# Patient Record
Sex: FEMALE | Race: WHITE | NOT HISPANIC OR LATINO | URBAN - METROPOLITAN AREA
[De-identification: names, ages, dates, MRNs, and addresses within clinical notes are randomized per-mention and may not be internally consistent; named-entity substitution may affect disease eponyms.]

---

## 2024-10-16 ENCOUNTER — NURSING HOME VISIT (OUTPATIENT)
Dept: POST ACUTE CARE | Facility: EXTERNAL LOCATION | Age: 84
End: 2024-10-16
Payer: MEDICARE

## 2024-10-16 DIAGNOSIS — E78.2 MIXED HYPERLIPIDEMIA: ICD-10-CM

## 2024-10-16 DIAGNOSIS — R29.6 FREQUENT FALLS: ICD-10-CM

## 2024-10-16 DIAGNOSIS — I10 ESSENTIAL HYPERTENSION, BENIGN: ICD-10-CM

## 2024-10-16 DIAGNOSIS — I25.83 CORONARY ARTERY DISEASE DUE TO LIPID RICH PLAQUE: ICD-10-CM

## 2024-10-16 DIAGNOSIS — I25.10 CORONARY ARTERY DISEASE DUE TO LIPID RICH PLAQUE: ICD-10-CM

## 2024-10-16 DIAGNOSIS — R53.1 WEAKNESS: ICD-10-CM

## 2024-10-16 DIAGNOSIS — I63.81 LEFT SIDED LACUNAR INFARCTION (MULTI): Primary | ICD-10-CM

## 2024-10-16 DIAGNOSIS — R41.0 DELIRIUM: ICD-10-CM

## 2024-10-16 DIAGNOSIS — E55.9 VITAMIN D DEFICIENCY: ICD-10-CM

## 2024-10-16 DIAGNOSIS — R53.81 PHYSICAL DECONDITIONING: ICD-10-CM

## 2024-10-16 DIAGNOSIS — R41.89 COGNITIVE IMPAIRMENT: ICD-10-CM

## 2024-10-16 DIAGNOSIS — E03.9 HYPOTHYROIDISM, ACQUIRED: ICD-10-CM

## 2024-10-16 PROCEDURE — 99309 SBSQ NF CARE MODERATE MDM 30: CPT | Performed by: NURSE PRACTITIONER

## 2024-10-16 NOTE — LETTER
Patient: Nasima Nails  : 1940    Encounter Date: 10/16/2024    Chief Complaint:   SNF F/U  SNF F/U  -Multiple falls  -Physical deconditioning  -L thalamus infarct  -UTI    HPI:   84 year-old female presenting to the ER on 10/8/24 s/p multiple falls. Pt. resides at AL and this was her 2nd fall over the past week. She was noted to have a bruise to her R cheek. MRI revealed a small acute lacunar infarct in the L thalamus. Neuro was consulted. TTE was unremarkable. ASA/Plavix resistance panel indicated adequate therapeutic response. Pt. to F/U with neurology as an OP. UA was positive for pyuria, but culture was negative. No recommendation for ATB from ID. Geriatrics was consulted for delirium/agitation, Recommended frequent reorientation, minimize nocturnal disturbances, encourage oral intake, and physical therapy to address deficits. Pt. to F/U with geriatrics 6-8 weeks after discharge. PT/OT recommended SNF and patient was discharged to [facility] on 10/14/24.    Today, patient denies dizziness, HA, SOB, cough, or chest pain. She reports appetite at baseline. She is confused, but redirectable. Staff report no clinical concerns at this time.     ROS:    As above in HPI. Otherwise, all other systems have been reviewed and are negative for complaint.    Medications reviewed and verified in NH chart.     Patient Active Problem List   Diagnosis   • AAA (abdominal aortic aneurysm) without rupture (CMS-HCC)   • CAD (coronary artery disease)   • Chronic idiopathic constipation   • Essential hypertension, benign   • Frequent falls   • Hypothyroidism, acquired   • Cognitive impairment   • Mixed hyperlipidemia   • S/P AAA repair   • Left sided lacunar infarction (Multi)   • Routine general medical examination at a health care facility        No past medical history on file.    Past Surgical History:   Procedure Laterality Date   • ABDOMINAL AORTIC ANEURYSM REPAIR     • BACK SURGERY      cervical disc surgery   •  CARDIAC CATHETERIZATION      with 5 total stents in  and    • EYE SURGERY      cataract surgery   • JOINT REPLACEMENT     • KNEE ARTHROPLASTY Left 2006       Family History   Problem Relation Name Age of Onset   • Heart attack Mother  72   • Diabetes type II Brother     • Heart disease Maternal Grandmother  85   • Colon cancer Maternal Grandfather         Social History     Tobacco Use   Smoking Status Former   • Current packs/day: 0.00   • Average packs/day: 1 pack/day for 35.0 years (35.0 ttl pk-yrs)   • Types: Cigarettes   • Start date:    • Quit date:    • Years since quittin.9   Smokeless Tobacco Never       Social History     Substance and Sexual Activity   Alcohol Use Never       Social History     Substance and Sexual Activity   Drug Use Never       Allergies   Allergen Reactions   • Cephalexin Hives and Itching   • Ciprofloxacin Unknown   • Codeine Hives and Itching   • Erythromycin Hives and Itching   • Oxycodone Hives and Itching   • Tramadol Hives and Itching   • Minocycline Rash   • Penicillins Hives and Rash        Vital Signs:   132/87-75-18-97.5-97% on RA    Physical Exam:  General: Sitting up in WC in NAD, alert   Head/Face: NCAT, symmetrical  Eyes: PERRLA, no injection, no discharge  ENT: Hearing not impaired, ears without scars or lesions, nasal mucosa and turbinates pink, septum midline, lips pink and moist  Neck: Supple, symmetrical  Respiratory: CTA but diminished without adventitious sounds, respirations even and nonlabored without use of accessory muscles, good air exchange  Cardio: RRR without murmur or gallops, normal S1S2, no edema, pedal pulses 3+/4 bilaterally  Chest/Breast: Symmetrical  GI: BS x 4, normoactive, non-distended, abd round and soft, no masses or tenderness  : No suprapubic tenderness or distention  MSK: Gait not assessed, joints with full ROM without pain or contractures  Skin: Skin warm and dry, no induration, bruising to R side of face and  RFA  Neurologic: Cranial nerves II through XII intact, superficial touch and pain sensation intact  Psychiatric: Alert, oriented x 1-2, calm and cooperative, redirectable    Results/Data:   10/13/24: Glu 111, BUN 35, Cr 0.98, Wood 10.4, GFR 57, WBC 13.01    Assessment/Plan:  Frequent falls/physical deconditioning/weakness-c/w PT/OT, safety and fall precautions  Acute lacunar infarct in the L thalamus-c/w atorvastatin, plavix, and ASA, PT/OT, F/U at [facility]  Delirium/agitation/cognitive impairment/insomnia/anxiety-c/w buspar, remeron, and melatonin, vistaril prn for anxiety, seroquel prn for agitation, consult Psych prn, F/U with geriatrics after discharge  CAD s/p stent placement/HTN/HLD-2 gm Na+ diet, c/w statin, plavix, ASA, losartan, and metoprolol, monitor BP and HR, F/U with cardiologist after discharge  Vit D deficiency-c/w supplement  Hypothyroidism-c/w levothyroxine    Orders:  NNO    Code Status:   Full Code          Electronically Signed By: NINI Das   12/2/24 10:19 AM

## 2024-10-17 ENCOUNTER — NURSING HOME VISIT (OUTPATIENT)
Dept: POST ACUTE CARE | Facility: EXTERNAL LOCATION | Age: 84
End: 2024-10-17
Payer: MEDICARE

## 2024-10-17 ENCOUNTER — LAB REQUISITION (OUTPATIENT)
Dept: LAB | Facility: HOSPITAL | Age: 84
End: 2024-10-17
Payer: MEDICARE

## 2024-10-17 DIAGNOSIS — I21.9 ACUTE MYOCARDIAL INFARCTION, UNSPECIFIED: ICD-10-CM

## 2024-10-17 DIAGNOSIS — E03.9 HYPOTHYROIDISM, ACQUIRED: ICD-10-CM

## 2024-10-17 DIAGNOSIS — I10 ESSENTIAL (PRIMARY) HYPERTENSION: ICD-10-CM

## 2024-10-17 DIAGNOSIS — Z78.9 NURSING HOME RESIDENT: ICD-10-CM

## 2024-10-17 DIAGNOSIS — E78.2 MIXED HYPERLIPIDEMIA: ICD-10-CM

## 2024-10-17 DIAGNOSIS — I25.83 CORONARY ARTERY DISEASE DUE TO LIPID RICH PLAQUE: ICD-10-CM

## 2024-10-17 DIAGNOSIS — R29.6 FREQUENT FALLS: ICD-10-CM

## 2024-10-17 DIAGNOSIS — I71.40 ABDOMINAL AORTIC ANEURYSM, WITHOUT RUPTURE, UNSPECIFIED (CMS-HCC): ICD-10-CM

## 2024-10-17 DIAGNOSIS — I63.81 LEFT SIDED LACUNAR INFARCTION (MULTI): ICD-10-CM

## 2024-10-17 DIAGNOSIS — I25.10 CORONARY ARTERY DISEASE DUE TO LIPID RICH PLAQUE: ICD-10-CM

## 2024-10-17 DIAGNOSIS — I10 ESSENTIAL HYPERTENSION, BENIGN: ICD-10-CM

## 2024-10-17 DIAGNOSIS — Z00.00 ROUTINE GENERAL MEDICAL EXAMINATION AT A HEALTH CARE FACILITY: ICD-10-CM

## 2024-10-17 DIAGNOSIS — R41.89 COGNITIVE IMPAIRMENT: ICD-10-CM

## 2024-10-17 LAB
ANION GAP SERPL CALC-SCNC: 13 MMOL/L (ref 10–20)
BUN SERPL-MCNC: 31 MG/DL (ref 6–23)
CALCIUM SERPL-MCNC: 10.4 MG/DL (ref 8.6–10.3)
CHLORIDE SERPL-SCNC: 105 MMOL/L (ref 98–107)
CO2 SERPL-SCNC: 27 MMOL/L (ref 21–32)
CREAT SERPL-MCNC: 1.1 MG/DL (ref 0.5–1.05)
EGFRCR SERPLBLD CKD-EPI 2021: 50 ML/MIN/1.73M*2
ERYTHROCYTE [DISTWIDTH] IN BLOOD BY AUTOMATED COUNT: 14.2 % (ref 11.5–14.5)
GLUCOSE SERPL-MCNC: 90 MG/DL (ref 74–99)
HCT VFR BLD AUTO: 40.9 % (ref 36–46)
HGB BLD-MCNC: 13.1 G/DL (ref 12–16)
MCH RBC QN AUTO: 30.3 PG (ref 26–34)
MCHC RBC AUTO-ENTMCNC: 32 G/DL (ref 32–36)
MCV RBC AUTO: 95 FL (ref 80–100)
NRBC BLD-RTO: 0 /100 WBCS (ref 0–0)
PLATELET # BLD AUTO: 285 X10*3/UL (ref 150–450)
POTASSIUM SERPL-SCNC: 5 MMOL/L (ref 3.5–5.3)
RBC # BLD AUTO: 4.32 X10*6/UL (ref 4–5.2)
SODIUM SERPL-SCNC: 140 MMOL/L (ref 136–145)
WBC # BLD AUTO: 10.5 X10*3/UL (ref 4.4–11.3)

## 2024-10-17 PROCEDURE — 36415 COLL VENOUS BLD VENIPUNCTURE: CPT | Mod: OUT | Performed by: INTERNAL MEDICINE

## 2024-10-17 PROCEDURE — 80048 BASIC METABOLIC PNL TOTAL CA: CPT | Mod: OUT | Performed by: INTERNAL MEDICINE

## 2024-10-17 PROCEDURE — 99306 1ST NF CARE HIGH MDM 50: CPT | Performed by: INTERNAL MEDICINE

## 2024-10-17 PROCEDURE — 85027 COMPLETE CBC AUTOMATED: CPT | Mod: OUT | Performed by: INTERNAL MEDICINE

## 2024-10-24 ENCOUNTER — LAB REQUISITION (OUTPATIENT)
Dept: LAB | Facility: HOSPITAL | Age: 84
End: 2024-10-24
Payer: MEDICARE

## 2024-10-24 DIAGNOSIS — I71.40 ABDOMINAL AORTIC ANEURYSM, WITHOUT RUPTURE, UNSPECIFIED (CMS-HCC): ICD-10-CM

## 2024-10-24 DIAGNOSIS — I10 ESSENTIAL (PRIMARY) HYPERTENSION: ICD-10-CM

## 2024-10-24 LAB
ANION GAP SERPL CALC-SCNC: 15 MMOL/L (ref 10–20)
BUN SERPL-MCNC: 19 MG/DL (ref 6–23)
CALCIUM SERPL-MCNC: 9.9 MG/DL (ref 8.6–10.3)
CHLORIDE SERPL-SCNC: 107 MMOL/L (ref 98–107)
CO2 SERPL-SCNC: 23 MMOL/L (ref 21–32)
CREAT SERPL-MCNC: 0.84 MG/DL (ref 0.5–1.05)
EGFRCR SERPLBLD CKD-EPI 2021: 69 ML/MIN/1.73M*2
ERYTHROCYTE [DISTWIDTH] IN BLOOD BY AUTOMATED COUNT: 14.3 % (ref 11.5–14.5)
GLUCOSE SERPL-MCNC: 76 MG/DL (ref 74–99)
HCT VFR BLD AUTO: 40.2 % (ref 36–46)
HGB BLD-MCNC: 12.8 G/DL (ref 12–16)
MCH RBC QN AUTO: 30.1 PG (ref 26–34)
MCHC RBC AUTO-ENTMCNC: 31.8 G/DL (ref 32–36)
MCV RBC AUTO: 95 FL (ref 80–100)
NRBC BLD-RTO: 0 /100 WBCS (ref 0–0)
PLATELET # BLD AUTO: 308 X10*3/UL (ref 150–450)
POTASSIUM SERPL-SCNC: 3.9 MMOL/L (ref 3.5–5.3)
RBC # BLD AUTO: 4.25 X10*6/UL (ref 4–5.2)
SODIUM SERPL-SCNC: 141 MMOL/L (ref 136–145)
WBC # BLD AUTO: 11.6 X10*3/UL (ref 4.4–11.3)

## 2024-10-24 PROCEDURE — 85027 COMPLETE CBC AUTOMATED: CPT | Mod: OUT | Performed by: INTERNAL MEDICINE

## 2024-10-24 PROCEDURE — 36415 COLL VENOUS BLD VENIPUNCTURE: CPT | Mod: OUT | Performed by: INTERNAL MEDICINE

## 2024-10-24 PROCEDURE — 82374 ASSAY BLOOD CARBON DIOXIDE: CPT | Mod: OUT | Performed by: INTERNAL MEDICINE

## 2024-10-26 ENCOUNTER — NURSING HOME VISIT (OUTPATIENT)
Dept: POST ACUTE CARE | Facility: EXTERNAL LOCATION | Age: 84
End: 2024-10-26
Payer: MEDICARE

## 2024-10-26 DIAGNOSIS — I10 ESSENTIAL HYPERTENSION, BENIGN: ICD-10-CM

## 2024-10-26 DIAGNOSIS — R29.6 FREQUENT FALLS: ICD-10-CM

## 2024-10-26 DIAGNOSIS — E78.2 MIXED HYPERLIPIDEMIA: ICD-10-CM

## 2024-10-26 DIAGNOSIS — I25.10 CORONARY ARTERY DISEASE DUE TO LIPID RICH PLAQUE: ICD-10-CM

## 2024-10-26 DIAGNOSIS — I63.81 LEFT SIDED LACUNAR INFARCTION (MULTI): ICD-10-CM

## 2024-10-26 DIAGNOSIS — Z00.00 ROUTINE GENERAL MEDICAL EXAMINATION AT A HEALTH CARE FACILITY: ICD-10-CM

## 2024-10-26 DIAGNOSIS — I25.83 CORONARY ARTERY DISEASE DUE TO LIPID RICH PLAQUE: ICD-10-CM

## 2024-10-26 DIAGNOSIS — E03.9 HYPOTHYROIDISM, ACQUIRED: ICD-10-CM

## 2024-10-26 DIAGNOSIS — R41.89 COGNITIVE IMPAIRMENT: ICD-10-CM

## 2024-10-26 PROCEDURE — 99308 SBSQ NF CARE LOW MDM 20: CPT | Performed by: INTERNAL MEDICINE

## 2024-10-26 NOTE — LETTER
Patient: Nasima Nails  : 1940    Encounter Date: 10/26/2024    Name: Nasima Nails  : 1940  MRN: [id number]  Visit Date: 10/26/2024  Chief Complaint: Weekly SNF Physician Visit    HPI: 85 y/o, Full Code, lives at an AL, presented to ER with multiple falls. There is a bruise over her right cheek. MRI revealed a small acute lacunar infarct in the L thalamus. Neurology was consulted. TTE was unremarkable. Aspirin/Plavix resistance panel indicated adequate therapeutic response. Recommended follow up with [facility] outpatient. UA was positive for pyuria but Urine culture was negative. No recommendation for antibiotics from infectious disease. Geriatrics consulted for delirium/agitation. Recommending frequent reorientation, minimize nocturnal disturbances, encourage oral intake and physical therapy to address deficits. Patient should follow up outpatient with Geriatrics in 6 to 8 weeks. Seroquel PRN initiated. Once stabilized, pt was brought to [facility] on 10/17/2024 for ongoing med mgt and therapy services.     Subjective: Seen and examined today. Denies N/V/D/C/CP. No fever or chills. Nursing team report no issues.     The patient was counseled regarding diagnostic results, instructions for management, risk factor reductions, prognosis, patient and family education, impressions, risks and benefits of treatment options and importance of compliance with treatment. I have reviewed nursing notes since my last visit and document any significant changes Reviewed orders, medications, Labs. Reviewed chart looking at current medications, treatments, labs, x-rays etc.     ROS:  As above in subjective. Otherwise, all other systems have been reviewed and are negative for complaint.    Current Outpatient Medications   Medication Instructions   • acetaminophen (TYLENOL) 650 mg, oral, Every 12 hours PRN   • aspirin 81 mg, oral, Daily   • atorvastatin (LIPITOR) 80 mg, oral, Daily   • busPIRone  (BUSPAR) 5 mg, oral, 3 times daily   • cholecalciferol (VITAMIN D-3) 2,000 Units, oral, Daily RT   • clopidogrel (Plavix) 75 mg tablet 1 tablet, Daily   • hydrOXYzine HCL (ATARAX) 25 mg, oral, Every 6 hours PRN   • levothyroxine (Synthroid, Levoxyl) 88 mcg tablet 1 tablet, Daily   • losartan (Cozaar) 50 mg tablet 1 tablet, Daily   • melatonin 10 mg tablet 1 tablet, oral, Nightly PRN   • metoprolol tartrate (Lopressor) 25 mg tablet 1 tablet, 2 times daily   • mirtazapine (Remeron) 15 mg tablet 1 tablet, Nightly   • multivitamin tablet 1 tablet, oral, Daily     Vital Signs:   Vital Signs were reviewed in nursing home documentation.    Physical Exam  Vitals reviewed. Exam conducted with a chaperone present.   Constitutional:       Appearance: Normal appearance. She is well-developed.   HENT:      Head: Normocephalic.      Right Ear: External ear normal.      Left Ear: External ear normal.      Nose: Nose normal.      Mouth/Throat:      Lips: Pink.      Mouth: Mucous membranes are moist.   Eyes:      General: Lids are normal.      Pupils: Pupils are equal, round, and reactive to light.   Neck:      Trachea: Trachea normal.   Cardiovascular:      Rate and Rhythm: Normal rate and regular rhythm.      Heart sounds: Normal heart sounds.   Pulmonary:      Effort: Pulmonary effort is normal.      Breath sounds: Normal breath sounds.   Abdominal:      General: Bowel sounds are normal.      Palpations: Abdomen is soft.   Musculoskeletal:      Cervical back: Full passive range of motion without pain.   Skin:     General: Skin is warm and moist.      Findings: Bruising present.   Neurological:      General: No focal deficit present.      Mental Status: She is alert. Mental status is at baseline.      Motor: Weakness present.   Psychiatric:         Attention and Perception: Attention normal.         Mood and Affect: Mood normal.         Speech: Speech normal.         Behavior: Behavior is cooperative.         Thought Content:  Thought content normal.         Cognition and Memory: Cognition is impaired. Memory is impaired.         Judgment: Judgment normal.         Results/Data:   Lab Results   Component Value Date    WBC 10.0 10/31/2024    HGB 12.8 10/31/2024    HCT 40.4 10/31/2024     10/31/2024     10/31/2024    K 4.1 10/31/2024     10/31/2024    CREATININE 0.84 10/31/2024    BUN 17 10/31/2024    CO2 25 10/31/2024    HGBA1C 5.7 (H) 10/08/2024     Results were reviewed and addressed accordingly. Lab Results were also reviewed in [facility].     Provider Impression:   Multiple Falls most likely 2/2 stroke  - MRI revealed a small acute lacunar infarct in the L thalamus.   - Neurology was consulted.   - TTE was unremarkable.   - Aspirin/Plavix resistance panel indicated adequate therapeutic response.  - Recommended follow up with [facility] outpatient.     Agitation with Delirium during hospitalization  - Geriatrics consulted for delirium/agitation.   - Recommending frequent reorientation, minimize nocturnal disturbances, encourage oral intake and physical therapy to address deficits.   - Patient should follow up outpatient with Geriatrics in 6 to 8 weeks.    CV- CAD s/p stent x5, HTN, HLD  - c/w losartan, metoprolol  - c/w statin    Hypothyroidism  - c/w synthroid    Insomnia  - c/w melatonin    Vitamin D Deficiency  - c/w supplementation    Depression, Anxiety  - c/w remeron, buspar, PRN hydroxyzine    Code Status  - Full Code    Disposition  - lives in an assisted living  ----------------  Written by Yasmeen Pearson RN, acting as a scribe for Dr. Edwards. This note accurately reflects the work and decisions made by Dr. Edwards.     I, Dr. Edwards, attest all medical record entries made by the scribe were under my direction and were personally dictated by me. I have reviewed the chart and agree that the record accurately reflects my performance of the history, physical exam, and assessment and plan.        Electronically Signed By: Yola Ruelas MD   11/5/24  8:52 AM

## 2024-10-29 PROBLEM — Z86.79 S/P AAA REPAIR: Status: ACTIVE | Noted: 2018-07-05

## 2024-10-29 PROBLEM — Z00.00 ROUTINE GENERAL MEDICAL EXAMINATION AT A HEALTH CARE FACILITY: Status: ACTIVE | Noted: 2024-10-29

## 2024-10-29 PROBLEM — I10 ESSENTIAL HYPERTENSION, BENIGN: Status: ACTIVE | Noted: 2024-10-29

## 2024-10-29 PROBLEM — K59.04 CHRONIC IDIOPATHIC CONSTIPATION: Status: ACTIVE | Noted: 2024-10-10

## 2024-10-29 PROBLEM — Z98.890 S/P AAA REPAIR: Status: ACTIVE | Noted: 2018-07-05

## 2024-10-29 PROBLEM — E78.2 MIXED HYPERLIPIDEMIA: Status: ACTIVE | Noted: 2024-10-29

## 2024-10-29 PROBLEM — I71.40 AAA (ABDOMINAL AORTIC ANEURYSM) WITHOUT RUPTURE (CMS-HCC): Status: ACTIVE | Noted: 2018-07-03

## 2024-10-29 PROBLEM — E03.9 HYPOTHYROIDISM, ACQUIRED: Status: ACTIVE | Noted: 2020-10-01

## 2024-10-29 PROBLEM — R41.89 COGNITIVE IMPAIRMENT: Status: ACTIVE | Noted: 2024-10-08

## 2024-10-29 PROBLEM — I63.81 LEFT SIDED LACUNAR INFARCTION (MULTI): Status: ACTIVE | Noted: 2024-10-29

## 2024-10-29 PROBLEM — R29.6 FREQUENT FALLS: Status: ACTIVE | Noted: 2024-10-08

## 2024-10-29 RX ORDER — HYDROXYZINE HYDROCHLORIDE 25 MG/1
25 TABLET, FILM COATED ORAL EVERY 6 HOURS PRN
COMMUNITY

## 2024-10-29 RX ORDER — METOPROLOL TARTRATE 25 MG/1
1 TABLET, FILM COATED ORAL 2 TIMES DAILY
COMMUNITY
Start: 2023-01-13

## 2024-10-29 RX ORDER — ATORVASTATIN CALCIUM 80 MG/1
80 TABLET, FILM COATED ORAL DAILY
COMMUNITY

## 2024-10-29 RX ORDER — ASPIRIN 81 MG/1
81 TABLET ORAL DAILY
COMMUNITY

## 2024-10-29 RX ORDER — CLOPIDOGREL BISULFATE 75 MG/1
1 TABLET ORAL DAILY
COMMUNITY
Start: 2023-04-09

## 2024-10-29 RX ORDER — BISMUTH SUBSALICYLATE 262 MG
1 TABLET,CHEWABLE ORAL DAILY
COMMUNITY

## 2024-10-29 RX ORDER — ACETAMINOPHEN, DIPHENHYDRAMINE HCL, PHENYLEPHRINE HCL 325; 25; 5 MG/1; MG/1; MG/1
1 TABLET ORAL NIGHTLY PRN
COMMUNITY

## 2024-10-29 RX ORDER — LEVOTHYROXINE SODIUM 88 UG/1
1 TABLET ORAL DAILY
COMMUNITY
Start: 2023-02-09

## 2024-10-29 RX ORDER — ACETAMINOPHEN 325 MG/1
650 TABLET ORAL EVERY 12 HOURS PRN
COMMUNITY

## 2024-10-29 RX ORDER — LOSARTAN POTASSIUM 50 MG/1
1 TABLET ORAL DAILY
COMMUNITY
Start: 2023-04-09

## 2024-10-29 RX ORDER — BUSPIRONE HYDROCHLORIDE 5 MG/1
5 TABLET ORAL 3 TIMES DAILY
COMMUNITY

## 2024-10-29 RX ORDER — CHOLECALCIFEROL (VITAMIN D3) 25 MCG
2000 TABLET ORAL
COMMUNITY

## 2024-10-29 RX ORDER — MIRTAZAPINE 15 MG/1
1 TABLET, FILM COATED ORAL NIGHTLY
COMMUNITY
Start: 2023-02-09

## 2024-10-31 ENCOUNTER — LAB REQUISITION (OUTPATIENT)
Dept: LAB | Facility: HOSPITAL | Age: 84
End: 2024-10-31
Payer: MEDICARE

## 2024-10-31 ENCOUNTER — NURSING HOME VISIT (OUTPATIENT)
Dept: POST ACUTE CARE | Facility: EXTERNAL LOCATION | Age: 84
End: 2024-10-31
Payer: MEDICARE

## 2024-10-31 DIAGNOSIS — E78.2 MIXED HYPERLIPIDEMIA: ICD-10-CM

## 2024-10-31 DIAGNOSIS — Z00.00 ROUTINE GENERAL MEDICAL EXAMINATION AT A HEALTH CARE FACILITY: ICD-10-CM

## 2024-10-31 DIAGNOSIS — Z78.9 NURSING HOME RESIDENT: ICD-10-CM

## 2024-10-31 DIAGNOSIS — I25.83 CORONARY ARTERY DISEASE DUE TO LIPID RICH PLAQUE: ICD-10-CM

## 2024-10-31 DIAGNOSIS — E03.9 HYPOTHYROIDISM, ACQUIRED: ICD-10-CM

## 2024-10-31 DIAGNOSIS — F03.90 UNSPECIFIED DEMENTIA, UNSPECIFIED SEVERITY, WITHOUT BEHAVIORAL DISTURBANCE, PSYCHOTIC DISTURBANCE, MOOD DISTURBANCE, AND ANXIETY: ICD-10-CM

## 2024-10-31 DIAGNOSIS — I10 ESSENTIAL HYPERTENSION, BENIGN: ICD-10-CM

## 2024-10-31 DIAGNOSIS — R41.89 COGNITIVE IMPAIRMENT: ICD-10-CM

## 2024-10-31 DIAGNOSIS — I63.81 LEFT SIDED LACUNAR INFARCTION (MULTI): ICD-10-CM

## 2024-10-31 DIAGNOSIS — I25.10 CORONARY ARTERY DISEASE DUE TO LIPID RICH PLAQUE: ICD-10-CM

## 2024-10-31 DIAGNOSIS — R29.6 FREQUENT FALLS: ICD-10-CM

## 2024-10-31 LAB
ANION GAP SERPL CALC-SCNC: 13 MMOL/L (ref 10–20)
BUN SERPL-MCNC: 17 MG/DL (ref 6–23)
CALCIUM SERPL-MCNC: 10 MG/DL (ref 8.6–10.3)
CHLORIDE SERPL-SCNC: 107 MMOL/L (ref 98–107)
CO2 SERPL-SCNC: 25 MMOL/L (ref 21–32)
CREAT SERPL-MCNC: 0.84 MG/DL (ref 0.5–1.05)
EGFRCR SERPLBLD CKD-EPI 2021: 69 ML/MIN/1.73M*2
ERYTHROCYTE [DISTWIDTH] IN BLOOD BY AUTOMATED COUNT: 14.4 % (ref 11.5–14.5)
GLUCOSE SERPL-MCNC: 85 MG/DL (ref 74–99)
HCT VFR BLD AUTO: 40.4 % (ref 36–46)
HGB BLD-MCNC: 12.8 G/DL (ref 12–16)
MCH RBC QN AUTO: 30.3 PG (ref 26–34)
MCHC RBC AUTO-ENTMCNC: 31.7 G/DL (ref 32–36)
MCV RBC AUTO: 96 FL (ref 80–100)
NRBC BLD-RTO: 0 /100 WBCS (ref 0–0)
PLATELET # BLD AUTO: 312 X10*3/UL (ref 150–450)
POTASSIUM SERPL-SCNC: 4.1 MMOL/L (ref 3.5–5.3)
RBC # BLD AUTO: 4.23 X10*6/UL (ref 4–5.2)
SODIUM SERPL-SCNC: 141 MMOL/L (ref 136–145)
WBC # BLD AUTO: 10 X10*3/UL (ref 4.4–11.3)

## 2024-10-31 PROCEDURE — 85027 COMPLETE CBC AUTOMATED: CPT | Mod: OUT | Performed by: INTERNAL MEDICINE

## 2024-10-31 PROCEDURE — 36415 COLL VENOUS BLD VENIPUNCTURE: CPT | Mod: OUT | Performed by: INTERNAL MEDICINE

## 2024-10-31 PROCEDURE — 99308 SBSQ NF CARE LOW MDM 20: CPT | Performed by: INTERNAL MEDICINE

## 2024-10-31 PROCEDURE — 80048 BASIC METABOLIC PNL TOTAL CA: CPT | Mod: OUT | Performed by: INTERNAL MEDICINE

## 2024-10-31 NOTE — LETTER
Patient: Nasima Nails  : 1940    Encounter Date: 10/31/2024    Name: Nasima Nails  : 1940  MRN: [id number]  Visit Date: 10/31/2024  Chief Complaint: Weekly SNF Physician Visit    HPI: 85 y/o, Full Code, lives at an AL, presented to ER with multiple falls. There is a bruise over her right cheek. MRI revealed a small acute lacunar infarct in the L thalamus. Neurology was consulted. TTE was unremarkable. Aspirin/Plavix resistance panel indicated adequate therapeutic response. Recommended follow up with [facility] outpatient. UA was positive for pyuria but Urine culture was negative. No recommendation for antibiotics from infectious disease. Geriatrics consulted for delirium/agitation. Recommending frequent reorientation, minimize nocturnal disturbances, encourage oral intake and physical therapy to address deficits. Patient should follow up outpatient with Geriatrics in 6 to 8 weeks. Seroquel PRN initiated. Once stabilized, pt was brought to [facility] on 10/17/2024 for ongoing med mgt and therapy services.     Subjective: Seen and examined today. Denies N/V/D/C/CP. No fever or chills. Nursing team report no issues.     The patient was counseled regarding diagnostic results, instructions for management, risk factor reductions, prognosis, patient and family education, impressions, risks and benefits of treatment options and importance of compliance with treatment. I have reviewed nursing notes since my last visit and document any significant changes Reviewed orders, medications, Labs. Reviewed chart looking at current medications, treatments, labs, x-rays etc.     ROS:  As above in subjective. Otherwise, all other systems have been reviewed and are negative for complaint.    Current Outpatient Medications   Medication Instructions   • acetaminophen (TYLENOL) 650 mg, oral, Every 12 hours PRN   • aspirin 81 mg, oral, Daily   • atorvastatin (LIPITOR) 80 mg, oral, Daily   • busPIRone  (BUSPAR) 5 mg, oral, 3 times daily   • cholecalciferol (VITAMIN D-3) 2,000 Units, oral, Daily RT   • clopidogrel (Plavix) 75 mg tablet 1 tablet, Daily   • hydrOXYzine HCL (ATARAX) 25 mg, oral, Every 6 hours PRN   • levothyroxine (Synthroid, Levoxyl) 88 mcg tablet 1 tablet, Daily   • losartan (Cozaar) 50 mg tablet 1 tablet, Daily   • melatonin 10 mg tablet 1 tablet, oral, Nightly PRN   • metoprolol tartrate (Lopressor) 25 mg tablet 1 tablet, 2 times daily   • mirtazapine (Remeron) 15 mg tablet 1 tablet, Nightly   • multivitamin tablet 1 tablet, oral, Daily     Vital Signs:   Vital Signs were reviewed in nursing home documentation.    Physical Exam  Vitals reviewed. Exam conducted with a chaperone present.   Constitutional:       Appearance: Normal appearance. She is well-developed.   HENT:      Head: Normocephalic.      Right Ear: External ear normal.      Left Ear: External ear normal.      Nose: Nose normal.      Mouth/Throat:      Lips: Pink.      Mouth: Mucous membranes are moist.   Eyes:      General: Lids are normal.      Pupils: Pupils are equal, round, and reactive to light.   Neck:      Trachea: Trachea normal.   Cardiovascular:      Rate and Rhythm: Normal rate and regular rhythm.      Heart sounds: Normal heart sounds.   Pulmonary:      Effort: Pulmonary effort is normal.      Breath sounds: Normal breath sounds.   Abdominal:      General: Bowel sounds are normal.      Palpations: Abdomen is soft.   Musculoskeletal:      Cervical back: Full passive range of motion without pain.   Skin:     General: Skin is warm and moist.      Findings: Bruising present.   Neurological:      General: No focal deficit present.      Mental Status: She is alert. Mental status is at baseline.      Motor: Weakness present.   Psychiatric:         Attention and Perception: Attention normal.         Mood and Affect: Mood normal.         Speech: Speech normal.         Behavior: Behavior is cooperative.         Thought Content:  Thought content normal.         Cognition and Memory: Cognition is impaired. Memory is impaired.         Judgment: Judgment normal.         Results/Data:   Lab Results   Component Value Date    WBC 10.0 10/31/2024    HGB 12.8 10/31/2024    HCT 40.4 10/31/2024     10/31/2024     10/31/2024    K 4.1 10/31/2024     10/31/2024    CREATININE 0.84 10/31/2024    BUN 17 10/31/2024    CO2 25 10/31/2024    HGBA1C 5.7 (H) 10/08/2024     Results were reviewed and addressed accordingly. Lab Results were also reviewed in [facility].     Provider Impression:   Multiple Falls most likely 2/2 stroke  - MRI revealed a small acute lacunar infarct in the L thalamus.   - Neurology was consulted.   - TTE was unremarkable.   - Aspirin/Plavix resistance panel indicated adequate therapeutic response.  - Recommended follow up with [facility] outpatient.     Agitation with Delirium during hospitalization  - Geriatrics consulted for delirium/agitation.   - Recommending frequent reorientation, minimize nocturnal disturbances, encourage oral intake and physical therapy to address deficits.   - Patient should follow up outpatient with Geriatrics in 6 to 8 weeks.    CV- CAD s/p stent x5, HTN, HLD  - c/w losartan, metoprolol  - c/w statin    Hypothyroidism  - c/w synthroid    Insomnia  - c/w melatonin    Vitamin D Deficiency  - c/w supplementation    Depression, Anxiety  - c/w remeron, buspar, PRN hydroxyzine    Code Status  - Full Code    Disposition  - lives in an assisted living  ----------------  Written by Yasmeen Pearson RN, acting as a scribe for Dr. Edwards. This note accurately reflects the work and decisions made by Dr. Edwards.     I, Dr. Edwards, attest all medical record entries made by the scribe were under my direction and were personally dictated by me. I have reviewed the chart and agree that the record accurately reflects my performance of the history, physical exam, and assessment and plan.        Electronically Signed By: Yola Ruelas MD   11/5/24  8:53 AM

## 2024-11-04 NOTE — PROGRESS NOTES
Name: Nasima Nails  : 1940  MRN: [id number]  Visit Date: 10/26/2024  Chief Complaint: Weekly SNF Physician Visit    HPI: 85 y/o, Full Code, lives at an AL, presented to ER with multiple falls. There is a bruise over her right cheek. MRI revealed a small acute lacunar infarct in the L thalamus. Neurology was consulted. TTE was unremarkable. Aspirin/Plavix resistance panel indicated adequate therapeutic response. Recommended follow up with [facility] outpatient. UA was positive for pyuria but Urine culture was negative. No recommendation for antibiotics from infectious disease. Geriatrics consulted for delirium/agitation. Recommending frequent reorientation, minimize nocturnal disturbances, encourage oral intake and physical therapy to address deficits. Patient should follow up outpatient with Geriatrics in 6 to 8 weeks. Seroquel PRN initiated. Once stabilized, pt was brought to [facility] on 10/17/2024 for ongoing med mgt and therapy services.     Subjective: Seen and examined today. Denies N/V/D/C/CP. No fever or chills. Nursing team report no issues.     The patient was counseled regarding diagnostic results, instructions for management, risk factor reductions, prognosis, patient and family education, impressions, risks and benefits of treatment options and importance of compliance with treatment. I have reviewed nursing notes since my last visit and document any significant changes Reviewed orders, medications, Labs. Reviewed chart looking at current medications, treatments, labs, x-rays etc.     ROS:  As above in subjective. Otherwise, all other systems have been reviewed and are negative for complaint.    Current Outpatient Medications   Medication Instructions    acetaminophen (TYLENOL) 650 mg, oral, Every 12 hours PRN    aspirin 81 mg, oral, Daily    atorvastatin (LIPITOR) 80 mg, oral, Daily    busPIRone (BUSPAR) 5 mg, oral, 3 times daily    cholecalciferol (VITAMIN D-3) 2,000 Units,  oral, Daily RT    clopidogrel (Plavix) 75 mg tablet 1 tablet, Daily    hydrOXYzine HCL (ATARAX) 25 mg, oral, Every 6 hours PRN    levothyroxine (Synthroid, Levoxyl) 88 mcg tablet 1 tablet, Daily    losartan (Cozaar) 50 mg tablet 1 tablet, Daily    melatonin 10 mg tablet 1 tablet, oral, Nightly PRN    metoprolol tartrate (Lopressor) 25 mg tablet 1 tablet, 2 times daily    mirtazapine (Remeron) 15 mg tablet 1 tablet, Nightly    multivitamin tablet 1 tablet, oral, Daily     Vital Signs:   Vital Signs were reviewed in nursing home documentation.    Physical Exam  Vitals reviewed. Exam conducted with a chaperone present.   Constitutional:       Appearance: Normal appearance. She is well-developed.   HENT:      Head: Normocephalic.      Right Ear: External ear normal.      Left Ear: External ear normal.      Nose: Nose normal.      Mouth/Throat:      Lips: Pink.      Mouth: Mucous membranes are moist.   Eyes:      General: Lids are normal.      Pupils: Pupils are equal, round, and reactive to light.   Neck:      Trachea: Trachea normal.   Cardiovascular:      Rate and Rhythm: Normal rate and regular rhythm.      Heart sounds: Normal heart sounds.   Pulmonary:      Effort: Pulmonary effort is normal.      Breath sounds: Normal breath sounds.   Abdominal:      General: Bowel sounds are normal.      Palpations: Abdomen is soft.   Musculoskeletal:      Cervical back: Full passive range of motion without pain.   Skin:     General: Skin is warm and moist.      Findings: Bruising present.   Neurological:      General: No focal deficit present.      Mental Status: She is alert. Mental status is at baseline.      Motor: Weakness present.   Psychiatric:         Attention and Perception: Attention normal.         Mood and Affect: Mood normal.         Speech: Speech normal.         Behavior: Behavior is cooperative.         Thought Content: Thought content normal.         Cognition and Memory: Cognition is impaired. Memory is  impaired.         Judgment: Judgment normal.         Results/Data:   Lab Results   Component Value Date    WBC 10.0 10/31/2024    HGB 12.8 10/31/2024    HCT 40.4 10/31/2024     10/31/2024     10/31/2024    K 4.1 10/31/2024     10/31/2024    CREATININE 0.84 10/31/2024    BUN 17 10/31/2024    CO2 25 10/31/2024    HGBA1C 5.7 (H) 10/08/2024     Results were reviewed and addressed accordingly. Lab Results were also reviewed in [facility].     Provider Impression:   Multiple Falls most likely 2/2 stroke  - MRI revealed a small acute lacunar infarct in the L thalamus.   - Neurology was consulted.   - TTE was unremarkable.   - Aspirin/Plavix resistance panel indicated adequate therapeutic response.  - Recommended follow up with [facility] outpatient.     Agitation with Delirium during hospitalization  - Geriatrics consulted for delirium/agitation.   - Recommending frequent reorientation, minimize nocturnal disturbances, encourage oral intake and physical therapy to address deficits.   - Patient should follow up outpatient with Geriatrics in 6 to 8 weeks.    CV- CAD s/p stent x5, HTN, HLD  - c/w losartan, metoprolol  - c/w statin    Hypothyroidism  - c/w synthroid    Insomnia  - c/w melatonin    Vitamin D Deficiency  - c/w supplementation    Depression, Anxiety  - c/w remeron, buspar, PRN hydroxyzine    Code Status  - Full Code    Disposition  - lives in an assisted living  ----------------  Written by Yasmeen Pearson RN, acting as a scribe for Dr. Edwards. This note accurately reflects the work and decisions made by Dr. Edwards.     I, Dr. Edwards, attest all medical record entries made by the scribe were under my direction and were personally dictated by me. I have reviewed the chart and agree that the record accurately reflects my performance of the history, physical exam, and assessment and plan.

## 2024-11-04 NOTE — PROGRESS NOTES
Name: Nasima Nails  : 1940  MRN: [id number]  Visit Date: 10/31/2024  Chief Complaint: Weekly SNF Physician Visit    HPI: 83 y/o, Full Code, lives at an AL, presented to ER with multiple falls. There is a bruise over her right cheek. MRI revealed a small acute lacunar infarct in the L thalamus. Neurology was consulted. TTE was unremarkable. Aspirin/Plavix resistance panel indicated adequate therapeutic response. Recommended follow up with [facility] outpatient. UA was positive for pyuria but Urine culture was negative. No recommendation for antibiotics from infectious disease. Geriatrics consulted for delirium/agitation. Recommending frequent reorientation, minimize nocturnal disturbances, encourage oral intake and physical therapy to address deficits. Patient should follow up outpatient with Geriatrics in 6 to 8 weeks. Seroquel PRN initiated. Once stabilized, pt was brought to [facility] on 10/17/2024 for ongoing med mgt and therapy services.     Subjective: Seen and examined today. Denies N/V/D/C/CP. No fever or chills. Nursing team report no issues.     The patient was counseled regarding diagnostic results, instructions for management, risk factor reductions, prognosis, patient and family education, impressions, risks and benefits of treatment options and importance of compliance with treatment. I have reviewed nursing notes since my last visit and document any significant changes Reviewed orders, medications, Labs. Reviewed chart looking at current medications, treatments, labs, x-rays etc.     ROS:  As above in subjective. Otherwise, all other systems have been reviewed and are negative for complaint.    Current Outpatient Medications   Medication Instructions    acetaminophen (TYLENOL) 650 mg, oral, Every 12 hours PRN    aspirin 81 mg, oral, Daily    atorvastatin (LIPITOR) 80 mg, oral, Daily    busPIRone (BUSPAR) 5 mg, oral, 3 times daily    cholecalciferol (VITAMIN D-3) 2,000 Units,  oral, Daily RT    clopidogrel (Plavix) 75 mg tablet 1 tablet, Daily    hydrOXYzine HCL (ATARAX) 25 mg, oral, Every 6 hours PRN    levothyroxine (Synthroid, Levoxyl) 88 mcg tablet 1 tablet, Daily    losartan (Cozaar) 50 mg tablet 1 tablet, Daily    melatonin 10 mg tablet 1 tablet, oral, Nightly PRN    metoprolol tartrate (Lopressor) 25 mg tablet 1 tablet, 2 times daily    mirtazapine (Remeron) 15 mg tablet 1 tablet, Nightly    multivitamin tablet 1 tablet, oral, Daily     Vital Signs:   Vital Signs were reviewed in nursing home documentation.    Physical Exam  Vitals reviewed. Exam conducted with a chaperone present.   Constitutional:       Appearance: Normal appearance. She is well-developed.   HENT:      Head: Normocephalic.      Right Ear: External ear normal.      Left Ear: External ear normal.      Nose: Nose normal.      Mouth/Throat:      Lips: Pink.      Mouth: Mucous membranes are moist.   Eyes:      General: Lids are normal.      Pupils: Pupils are equal, round, and reactive to light.   Neck:      Trachea: Trachea normal.   Cardiovascular:      Rate and Rhythm: Normal rate and regular rhythm.      Heart sounds: Normal heart sounds.   Pulmonary:      Effort: Pulmonary effort is normal.      Breath sounds: Normal breath sounds.   Abdominal:      General: Bowel sounds are normal.      Palpations: Abdomen is soft.   Musculoskeletal:      Cervical back: Full passive range of motion without pain.   Skin:     General: Skin is warm and moist.      Findings: Bruising present.   Neurological:      General: No focal deficit present.      Mental Status: She is alert. Mental status is at baseline.      Motor: Weakness present.   Psychiatric:         Attention and Perception: Attention normal.         Mood and Affect: Mood normal.         Speech: Speech normal.         Behavior: Behavior is cooperative.         Thought Content: Thought content normal.         Cognition and Memory: Cognition is impaired. Memory is  impaired.         Judgment: Judgment normal.         Results/Data:   Lab Results   Component Value Date    WBC 10.0 10/31/2024    HGB 12.8 10/31/2024    HCT 40.4 10/31/2024     10/31/2024     10/31/2024    K 4.1 10/31/2024     10/31/2024    CREATININE 0.84 10/31/2024    BUN 17 10/31/2024    CO2 25 10/31/2024    HGBA1C 5.7 (H) 10/08/2024     Results were reviewed and addressed accordingly. Lab Results were also reviewed in [facility].     Provider Impression:   Multiple Falls most likely 2/2 stroke  - MRI revealed a small acute lacunar infarct in the L thalamus.   - Neurology was consulted.   - TTE was unremarkable.   - Aspirin/Plavix resistance panel indicated adequate therapeutic response.  - Recommended follow up with [facility] outpatient.     Agitation with Delirium during hospitalization  - Geriatrics consulted for delirium/agitation.   - Recommending frequent reorientation, minimize nocturnal disturbances, encourage oral intake and physical therapy to address deficits.   - Patient should follow up outpatient with Geriatrics in 6 to 8 weeks.    CV- CAD s/p stent x5, HTN, HLD  - c/w losartan, metoprolol  - c/w statin    Hypothyroidism  - c/w synthroid    Insomnia  - c/w melatonin    Vitamin D Deficiency  - c/w supplementation    Depression, Anxiety  - c/w remeron, buspar, PRN hydroxyzine    Code Status  - Full Code    Disposition  - lives in an assisted living  ----------------  Written by Yasmeen Pearson RN, acting as a scribe for Dr. Edwards. This note accurately reflects the work and decisions made by Dr. Edwards.     I, Dr. Edwards, attest all medical record entries made by the scribe were under my direction and were personally dictated by me. I have reviewed the chart and agree that the record accurately reflects my performance of the history, physical exam, and assessment and plan.

## 2024-12-02 NOTE — PROGRESS NOTES
Chief Complaint:   SNF F/U  SNF F/U  -Multiple falls  -Physical deconditioning  -L thalamus infarct  -UTI    HPI:   84 year-old female presenting to the ER on 10/8/24 s/p multiple falls. Pt. resides at AL and this was her 2nd fall over the past week. She was noted to have a bruise to her R cheek. MRI revealed a small acute lacunar infarct in the L thalamus. Neuro was consulted. TTE was unremarkable. ASA/Plavix resistance panel indicated adequate therapeutic response. Pt. to F/U with neurology as an OP. UA was positive for pyuria, but culture was negative. No recommendation for ATB from ID. Geriatrics was consulted for delirium/agitation, Recommended frequent reorientation, minimize nocturnal disturbances, encourage oral intake, and physical therapy to address deficits. Pt. to F/U with geriatrics 6-8 weeks after discharge. PT/OT recommended SNF and patient was discharged to [facility] on 10/14/24.    Today, patient denies dizziness, HA, SOB, cough, or chest pain. She reports appetite at baseline. She is confused, but redirectable. Staff report no clinical concerns at this time.     ROS:    As above in HPI. Otherwise, all other systems have been reviewed and are negative for complaint.    Medications reviewed and verified in NH chart.     Patient Active Problem List   Diagnosis    AAA (abdominal aortic aneurysm) without rupture (CMS-[facility])    CAD (coronary artery disease)    Chronic idiopathic constipation    Essential hypertension, benign    Frequent falls    Hypothyroidism, acquired    Cognitive impairment    Mixed hyperlipidemia    S/P AAA repair    Left sided lacunar infarction (Multi)    Routine general medical examination at a health care facility        No past medical history on file.    Past Surgical History:   Procedure Laterality Date    ABDOMINAL AORTIC ANEURYSM REPAIR      BACK SURGERY      cervical disc surgery    CARDIAC CATHETERIZATION      with 5 total stents in 2001 and 2003    EYE SURGERY       cataract surgery    JOINT REPLACEMENT      KNEE ARTHROPLASTY Left 2006       Family History   Problem Relation Name Age of Onset    Heart attack Mother  72    Diabetes type II Brother      Heart disease Maternal Grandmother  85    Colon cancer Maternal Grandfather         Social History     Tobacco Use   Smoking Status Former    Current packs/day: 0.00    Average packs/day: 1 pack/day for 35.0 years (35.0 ttl pk-yrs)    Types: Cigarettes    Start date:     Quit date:     Years since quittin.9   Smokeless Tobacco Never       Social History     Substance and Sexual Activity   Alcohol Use Never       Social History     Substance and Sexual Activity   Drug Use Never       Allergies   Allergen Reactions    Cephalexin Hives and Itching    Ciprofloxacin Unknown    Codeine Hives and Itching    Erythromycin Hives and Itching    Oxycodone Hives and Itching    Tramadol Hives and Itching    Minocycline Rash    Penicillins Hives and Rash        Vital Signs:   132/87-75-18-97.5-97% on RA    Physical Exam:  General: Sitting up in WC in NAD, alert   Head/Face: NCAT, symmetrical  Eyes: PERRLA, no injection, no discharge  ENT: Hearing not impaired, ears without scars or lesions, nasal mucosa and turbinates pink, septum midline, lips pink and moist  Neck: Supple, symmetrical  Respiratory: CTA but diminished without adventitious sounds, respirations even and nonlabored without use of accessory muscles, good air exchange  Cardio: RRR without murmur or gallops, normal S1S2, no edema, pedal pulses 3+/4 bilaterally  Chest/Breast: Symmetrical  GI: BS x 4, normoactive, non-distended, abd round and soft, no masses or tenderness  : No suprapubic tenderness or distention  MSK: Gait not assessed, joints with full ROM without pain or contractures  Skin: Skin warm and dry, no induration, bruising to R side of face and RFA  Neurologic: Cranial nerves II through XII intact, superficial touch and pain sensation intact  Psychiatric:  Alert, oriented x 1-2, calm and cooperative, redirectable    Results/Data:   10/13/24: Glu 111, BUN 35, Cr 0.98, Wood 10.4, GFR 57, WBC 13.01    Assessment/Plan:  Frequent falls/physical deconditioning/weakness-c/w PT/OT, safety and fall precautions  Acute lacunar infarct in the L thalamus-c/w atorvastatin, plavix, and ASA, PT/OT, F/U at cerebrovascular center  Delirium/agitation/cognitive impairment/insomnia/anxiety-c/w buspar, remeron, and melatonin, vistaril prn for anxiety, seroquel prn for agitation, consult Psych prn, F/U with geriatrics after discharge  CAD s/p stent placement/HTN/HLD-2 gm Na+ diet, c/w statin, plavix, ASA, losartan, and metoprolol, monitor BP and HR, F/U with cardiologist after discharge  Vit D deficiency-c/w supplement  Hypothyroidism-c/w levothyroxine    Orders:  NNO    Code Status:   Full Code

## 2025-06-25 ENCOUNTER — APPOINTMENT (OUTPATIENT)
Dept: RADIOLOGY | Facility: HOSPITAL | Age: 85
End: 2025-06-25
Payer: MEDICARE

## 2025-06-25 ENCOUNTER — HOSPITAL ENCOUNTER (INPATIENT)
Facility: HOSPITAL | Age: 85
LOS: 3 days | Discharge: NURSING, REHAB OR LONG-TERM CARE | End: 2025-06-28
Attending: EMERGENCY MEDICINE | Admitting: INTERNAL MEDICINE
Payer: MEDICARE

## 2025-06-25 ENCOUNTER — APPOINTMENT (OUTPATIENT)
Dept: CARDIOLOGY | Facility: HOSPITAL | Age: 85
End: 2025-06-25
Payer: MEDICARE

## 2025-06-25 DIAGNOSIS — I63.9 CEREBROVASCULAR ACCIDENT (CVA), UNSPECIFIED MECHANISM (MULTI): Primary | ICD-10-CM

## 2025-06-25 PROBLEM — N39.0 RECURRENT UTI: Status: ACTIVE | Noted: 2025-06-25

## 2025-06-25 PROBLEM — R53.1 RIGHT SIDED WEAKNESS: Status: ACTIVE | Noted: 2025-06-25

## 2025-06-25 LAB
ALBUMIN SERPL BCP-MCNC: 3.5 G/DL (ref 3.4–5)
ALP SERPL-CCNC: 87 U/L (ref 33–136)
ALT SERPL W P-5'-P-CCNC: 16 U/L (ref 7–45)
ANION GAP SERPL CALCULATED.3IONS-SCNC: 11 MMOL/L (ref 10–20)
APPEARANCE UR: CLEAR
APTT PPP: 30.4 SECONDS (ref 22–32.5)
AST SERPL W P-5'-P-CCNC: 23 U/L (ref 9–39)
BASOPHILS # BLD AUTO: 0.08 X10*3/UL (ref 0–0.1)
BASOPHILS NFR BLD AUTO: 0.5 %
BILIRUB SERPL-MCNC: 0.6 MG/DL (ref 0–1.2)
BILIRUB UR STRIP.AUTO-MCNC: NEGATIVE MG/DL
BNP SERPL-MCNC: 60 PG/ML (ref 0–99)
BUN SERPL-MCNC: 18 MG/DL (ref 6–23)
CALCIUM SERPL-MCNC: 10.3 MG/DL (ref 8.6–10.3)
CARDIAC TROPONIN I PNL SERPL HS: 11 NG/L (ref 0–13)
CHLORIDE SERPL-SCNC: 100 MMOL/L (ref 98–107)
CHOLEST SERPL-MCNC: 108 MG/DL (ref 0–199)
CHOLEST/HDLC SERPL: 2.7 {RATIO}
CO2 SERPL-SCNC: 28 MMOL/L (ref 21–32)
COLOR UR: YELLOW
CREAT SERPL-MCNC: 0.87 MG/DL (ref 0.5–1.05)
EGFRCR SERPLBLD CKD-EPI 2021: 65 ML/MIN/1.73M*2
EOSINOPHIL # BLD AUTO: 0.13 X10*3/UL (ref 0–0.4)
EOSINOPHIL NFR BLD AUTO: 0.8 %
ERYTHROCYTE [DISTWIDTH] IN BLOOD BY AUTOMATED COUNT: 15.4 % (ref 11.5–14.5)
GLUCOSE BLD MANUAL STRIP-MCNC: 101 MG/DL (ref 74–99)
GLUCOSE BLD MANUAL STRIP-MCNC: 178 MG/DL (ref 74–99)
GLUCOSE SERPL-MCNC: 143 MG/DL (ref 74–99)
GLUCOSE UR STRIP.AUTO-MCNC: NORMAL MG/DL
HCT VFR BLD AUTO: 38.3 % (ref 36–46)
HDLC SERPL-MCNC: 40.2 MG/DL
HGB BLD-MCNC: 12.6 G/DL (ref 12–16)
IMM GRANULOCYTES # BLD AUTO: 0.12 X10*3/UL (ref 0–0.5)
IMM GRANULOCYTES NFR BLD AUTO: 0.7 % (ref 0–0.9)
INR PPP: 1.1 (ref 0.9–1.2)
KETONES UR STRIP.AUTO-MCNC: ABNORMAL MG/DL
LDLC SERPL CALC-MCNC: 47 MG/DL
LEUKOCYTE ESTERASE UR QL STRIP.AUTO: NEGATIVE
LYMPHOCYTES # BLD AUTO: 1.94 X10*3/UL (ref 0.8–3)
LYMPHOCYTES NFR BLD AUTO: 12 %
MCH RBC QN AUTO: 29 PG (ref 26–34)
MCHC RBC AUTO-ENTMCNC: 32.9 G/DL (ref 32–36)
MCV RBC AUTO: 88 FL (ref 80–100)
MONOCYTES # BLD AUTO: 1.1 X10*3/UL (ref 0.05–0.8)
MONOCYTES NFR BLD AUTO: 6.8 %
NEUTROPHILS # BLD AUTO: 12.74 X10*3/UL (ref 1.6–5.5)
NEUTROPHILS NFR BLD AUTO: 79.2 %
NITRITE UR QL STRIP.AUTO: NEGATIVE
NON HDL CHOLESTEROL: 68 MG/DL (ref 0–149)
NRBC BLD-RTO: 0 /100 WBCS (ref 0–0)
PH UR STRIP.AUTO: 6 [PH]
PLATELET # BLD AUTO: 406 X10*3/UL (ref 150–450)
POCT GLUCOSE: 178 MG/DL (ref 74–99)
POTASSIUM SERPL-SCNC: 4.2 MMOL/L (ref 3.5–5.3)
PROT SERPL-MCNC: 7.5 G/DL (ref 6.4–8.2)
PROT UR STRIP.AUTO-MCNC: NEGATIVE MG/DL
PROTHROMBIN TIME: 12 SECONDS (ref 9.3–12.7)
RBC # BLD AUTO: 4.34 X10*6/UL (ref 4–5.2)
RBC # UR STRIP.AUTO: NEGATIVE MG/DL
SODIUM SERPL-SCNC: 135 MMOL/L (ref 136–145)
SP GR UR STRIP.AUTO: 1.04
TRIGL SERPL-MCNC: 106 MG/DL (ref 0–149)
UROBILINOGEN UR STRIP.AUTO-MCNC: NORMAL MG/DL
VLDL: 21 MG/DL (ref 0–40)
WBC # BLD AUTO: 16.1 X10*3/UL (ref 4.4–11.3)

## 2025-06-25 PROCEDURE — 84484 ASSAY OF TROPONIN QUANT: CPT | Performed by: EMERGENCY MEDICINE

## 2025-06-25 PROCEDURE — 82947 ASSAY GLUCOSE BLOOD QUANT: CPT

## 2025-06-25 PROCEDURE — 80053 COMPREHEN METABOLIC PANEL: CPT | Performed by: EMERGENCY MEDICINE

## 2025-06-25 PROCEDURE — 36415 COLL VENOUS BLD VENIPUNCTURE: CPT | Performed by: EMERGENCY MEDICINE

## 2025-06-25 PROCEDURE — 85025 COMPLETE CBC W/AUTO DIFF WBC: CPT | Performed by: EMERGENCY MEDICINE

## 2025-06-25 PROCEDURE — 93005 ELECTROCARDIOGRAM TRACING: CPT

## 2025-06-25 PROCEDURE — 2500000001 HC RX 250 WO HCPCS SELF ADMINISTERED DRUGS (ALT 637 FOR MEDICARE OP): Performed by: PHYSICIAN ASSISTANT

## 2025-06-25 PROCEDURE — 2550000001 HC RX 255 CONTRASTS: Performed by: PHYSICIAN ASSISTANT

## 2025-06-25 PROCEDURE — 2500000004 HC RX 250 GENERAL PHARMACY W/ HCPCS (ALT 636 FOR OP/ED): Performed by: NURSE PRACTITIONER

## 2025-06-25 PROCEDURE — 83880 ASSAY OF NATRIURETIC PEPTIDE: CPT | Performed by: NURSE PRACTITIONER

## 2025-06-25 PROCEDURE — 70498 CT ANGIOGRAPHY NECK: CPT | Performed by: STUDENT IN AN ORGANIZED HEALTH CARE EDUCATION/TRAINING PROGRAM

## 2025-06-25 PROCEDURE — 70498 CT ANGIOGRAPHY NECK: CPT

## 2025-06-25 PROCEDURE — G0427 INPT/ED TELECONSULT70: HCPCS | Performed by: STUDENT IN AN ORGANIZED HEALTH CARE EDUCATION/TRAINING PROGRAM

## 2025-06-25 PROCEDURE — 70496 CT ANGIOGRAPHY HEAD: CPT | Performed by: STUDENT IN AN ORGANIZED HEALTH CARE EDUCATION/TRAINING PROGRAM

## 2025-06-25 PROCEDURE — 83036 HEMOGLOBIN GLYCOSYLATED A1C: CPT | Mod: WESLAB | Performed by: EMERGENCY MEDICINE

## 2025-06-25 PROCEDURE — 70450 CT HEAD/BRAIN W/O DYE: CPT | Performed by: RADIOLOGY

## 2025-06-25 PROCEDURE — 87040 BLOOD CULTURE FOR BACTERIA: CPT | Mod: WESLAB | Performed by: NURSE PRACTITIONER

## 2025-06-25 PROCEDURE — 2060000001 HC INTERMEDIATE ICU ROOM DAILY

## 2025-06-25 PROCEDURE — 70450 CT HEAD/BRAIN W/O DYE: CPT

## 2025-06-25 PROCEDURE — 99285 EMERGENCY DEPT VISIT HI MDM: CPT | Mod: 25 | Performed by: EMERGENCY MEDICINE

## 2025-06-25 PROCEDURE — 94760 N-INVAS EAR/PLS OXIMETRY 1: CPT

## 2025-06-25 PROCEDURE — 85610 PROTHROMBIN TIME: CPT | Performed by: EMERGENCY MEDICINE

## 2025-06-25 PROCEDURE — 99223 1ST HOSP IP/OBS HIGH 75: CPT | Performed by: NURSE PRACTITIONER

## 2025-06-25 PROCEDURE — 36415 COLL VENOUS BLD VENIPUNCTURE: CPT | Performed by: NURSE PRACTITIONER

## 2025-06-25 PROCEDURE — 81003 URINALYSIS AUTO W/O SCOPE: CPT | Performed by: INTERNAL MEDICINE

## 2025-06-25 PROCEDURE — 80061 LIPID PANEL: CPT | Performed by: NURSE PRACTITIONER

## 2025-06-25 PROCEDURE — 85730 THROMBOPLASTIN TIME PARTIAL: CPT | Performed by: EMERGENCY MEDICINE

## 2025-06-25 PROCEDURE — 2500000001 HC RX 250 WO HCPCS SELF ADMINISTERED DRUGS (ALT 637 FOR MEDICARE OP): Performed by: NURSE PRACTITIONER

## 2025-06-25 RX ORDER — AMLODIPINE BESYLATE 5 MG/1
5 TABLET ORAL DAILY
COMMUNITY

## 2025-06-25 RX ORDER — AMLODIPINE BESYLATE 5 MG/1
5 TABLET ORAL DAILY
Status: DISCONTINUED | OUTPATIENT
Start: 2025-06-26 | End: 2025-06-28 | Stop reason: HOSPADM

## 2025-06-25 RX ORDER — ENOXAPARIN SODIUM 100 MG/ML
40 INJECTION SUBCUTANEOUS NIGHTLY
Status: DISCONTINUED | OUTPATIENT
Start: 2025-06-25 | End: 2025-06-28 | Stop reason: HOSPADM

## 2025-06-25 RX ORDER — DIVALPROEX SODIUM 125 MG/1
250 CAPSULE, COATED PELLETS ORAL 2 TIMES DAILY
COMMUNITY

## 2025-06-25 RX ORDER — QUETIAPINE FUMARATE 25 MG/1
25 TABLET, FILM COATED ORAL EVERY 6 HOURS PRN
Status: DISCONTINUED | OUTPATIENT
Start: 2025-06-25 | End: 2025-06-28 | Stop reason: HOSPADM

## 2025-06-25 RX ORDER — ACETAMINOPHEN 325 MG/1
650 TABLET ORAL EVERY 6 HOURS PRN
Status: DISCONTINUED | OUTPATIENT
Start: 2025-06-25 | End: 2025-06-28 | Stop reason: HOSPADM

## 2025-06-25 RX ORDER — ACETAMINOPHEN 500 MG
10 TABLET ORAL NIGHTLY PRN
Status: DISCONTINUED | OUTPATIENT
Start: 2025-06-25 | End: 2025-06-28 | Stop reason: HOSPADM

## 2025-06-25 RX ORDER — QUETIAPINE FUMARATE 25 MG/1
25 TABLET, FILM COATED ORAL EVERY 6 HOURS PRN
COMMUNITY

## 2025-06-25 RX ORDER — ATORVASTATIN CALCIUM 80 MG/1
80 TABLET, FILM COATED ORAL NIGHTLY
Status: DISCONTINUED | OUTPATIENT
Start: 2025-06-25 | End: 2025-06-28 | Stop reason: HOSPADM

## 2025-06-25 RX ORDER — LEVOTHYROXINE SODIUM 100 UG/1
100 TABLET ORAL
Status: DISCONTINUED | OUTPATIENT
Start: 2025-06-26 | End: 2025-06-28 | Stop reason: HOSPADM

## 2025-06-25 RX ORDER — GUAIFENESIN AND DEXTROMETHORPHAN HYDROBROMIDE 10; 200 MG/5ML; MG/5ML
10 LIQUID ORAL EVERY 6 HOURS PRN
COMMUNITY
End: 2025-06-28 | Stop reason: HOSPADM

## 2025-06-25 RX ORDER — MIRTAZAPINE 15 MG/1
15 TABLET, FILM COATED ORAL NIGHTLY
Status: DISCONTINUED | OUTPATIENT
Start: 2025-06-25 | End: 2025-06-28 | Stop reason: HOSPADM

## 2025-06-25 RX ORDER — CETIRIZINE HYDROCHLORIDE 10 MG/1
10 TABLET ORAL DAILY
COMMUNITY
End: 2025-06-28 | Stop reason: HOSPADM

## 2025-06-25 RX ORDER — SENNOSIDES 8.6 MG/1
2 TABLET ORAL NIGHTLY
COMMUNITY

## 2025-06-25 RX ORDER — CLOPIDOGREL BISULFATE 75 MG/1
75 TABLET ORAL DAILY
Status: DISCONTINUED | OUTPATIENT
Start: 2025-06-26 | End: 2025-06-28 | Stop reason: HOSPADM

## 2025-06-25 RX ORDER — DIVALPROEX SODIUM 125 MG/1
250 CAPSULE, COATED PELLETS ORAL 2 TIMES DAILY
Status: DISCONTINUED | OUTPATIENT
Start: 2025-06-25 | End: 2025-06-28 | Stop reason: HOSPADM

## 2025-06-25 RX ORDER — ASPIRIN 325 MG
325 TABLET ORAL ONCE
Status: COMPLETED | OUTPATIENT
Start: 2025-06-25 | End: 2025-06-25

## 2025-06-25 RX ORDER — QUETIAPINE FUMARATE 25 MG/1
37.5 TABLET, FILM COATED ORAL 2 TIMES DAILY
COMMUNITY

## 2025-06-25 RX ORDER — SENNOSIDES 8.6 MG/1
2 TABLET ORAL NIGHTLY
Status: DISCONTINUED | OUTPATIENT
Start: 2025-06-25 | End: 2025-06-28 | Stop reason: HOSPADM

## 2025-06-25 RX ORDER — ASPIRIN 81 MG/1
81 TABLET ORAL DAILY
Status: DISCONTINUED | OUTPATIENT
Start: 2025-06-26 | End: 2025-06-28 | Stop reason: HOSPADM

## 2025-06-25 RX ORDER — HYDROXYZINE HYDROCHLORIDE 25 MG/1
25 TABLET, FILM COATED ORAL EVERY 6 HOURS PRN
Status: DISCONTINUED | OUTPATIENT
Start: 2025-06-25 | End: 2025-06-28 | Stop reason: HOSPADM

## 2025-06-25 RX ORDER — LABETALOL HYDROCHLORIDE 5 MG/ML
10 INJECTION, SOLUTION INTRAVENOUS EVERY 10 MIN PRN
Status: ACTIVE | OUTPATIENT
Start: 2025-06-25 | End: 2025-06-27

## 2025-06-25 RX ADMIN — ATORVASTATIN CALCIUM 80 MG: 80 TABLET, FILM COATED ORAL at 23:14

## 2025-06-25 RX ADMIN — DIVALPROEX SODIUM 250 MG: 125 CAPSULE, COATED PELLETS ORAL at 23:14

## 2025-06-25 RX ADMIN — IOHEXOL 50 ML: 350 INJECTION, SOLUTION INTRAVENOUS at 13:15

## 2025-06-25 RX ADMIN — ASPIRIN 325 MG ORAL TABLET 325 MG: 325 PILL ORAL at 18:46

## 2025-06-25 RX ADMIN — STANDARDIZED SENNA CONCENTRATE 17.2 MG: 8.6 TABLET ORAL at 23:14

## 2025-06-25 RX ADMIN — MIRTAZAPINE 15 MG: 15 TABLET, FILM COATED ORAL at 23:14

## 2025-06-25 RX ADMIN — ENOXAPARIN SODIUM 40 MG: 100 INJECTION SUBCUTANEOUS at 23:14

## 2025-06-25 SDOH — ECONOMIC STABILITY: FOOD INSECURITY: WITHIN THE PAST 12 MONTHS, THE FOOD YOU BOUGHT JUST DIDN'T LAST AND YOU DIDN'T HAVE MONEY TO GET MORE.: NEVER TRUE

## 2025-06-25 SDOH — SOCIAL STABILITY: SOCIAL INSECURITY: ARE YOU MARRIED, WIDOWED, DIVORCED, SEPARATED, NEVER MARRIED, OR LIVING WITH A PARTNER?: MARRIED

## 2025-06-25 SDOH — HEALTH STABILITY: PHYSICAL HEALTH: ON AVERAGE, HOW MANY MINUTES DO YOU ENGAGE IN EXERCISE AT THIS LEVEL?: 0 MIN

## 2025-06-25 SDOH — ECONOMIC STABILITY: FOOD INSECURITY: HOW HARD IS IT FOR YOU TO PAY FOR THE VERY BASICS LIKE FOOD, HOUSING, MEDICAL CARE, AND HEATING?: NOT HARD AT ALL

## 2025-06-25 SDOH — SOCIAL STABILITY: SOCIAL INSECURITY: WITHIN THE LAST YEAR, HAVE YOU BEEN AFRAID OF YOUR PARTNER OR EX-PARTNER?: NO

## 2025-06-25 SDOH — SOCIAL STABILITY: SOCIAL NETWORK
DO YOU BELONG TO ANY CLUBS OR ORGANIZATIONS SUCH AS CHURCH GROUPS, UNIONS, FRATERNAL OR ATHLETIC GROUPS, OR SCHOOL GROUPS?: NO

## 2025-06-25 SDOH — HEALTH STABILITY: MENTAL HEALTH
DO YOU FEEL STRESS - TENSE, RESTLESS, NERVOUS, OR ANXIOUS, OR UNABLE TO SLEEP AT NIGHT BECAUSE YOUR MIND IS TROUBLED ALL THE TIME - THESE DAYS?: NOT AT ALL

## 2025-06-25 SDOH — SOCIAL STABILITY: SOCIAL NETWORK: IN A TYPICAL WEEK, HOW MANY TIMES DO YOU TALK ON THE PHONE WITH FAMILY, FRIENDS, OR NEIGHBORS?: NEVER

## 2025-06-25 SDOH — HEALTH STABILITY: MENTAL HEALTH: HOW MANY DRINKS CONTAINING ALCOHOL DO YOU HAVE ON A TYPICAL DAY WHEN YOU ARE DRINKING?: PATIENT DOES NOT DRINK

## 2025-06-25 SDOH — SOCIAL STABILITY: SOCIAL INSECURITY
WITHIN THE LAST YEAR, HAVE YOU BEEN KICKED, HIT, SLAPPED, OR OTHERWISE PHYSICALLY HURT BY YOUR PARTNER OR EX-PARTNER?: NO

## 2025-06-25 SDOH — HEALTH STABILITY: PHYSICAL HEALTH
HOW OFTEN DO YOU NEED TO HAVE SOMEONE HELP YOU WHEN YOU READ INSTRUCTIONS, PAMPHLETS, OR OTHER WRITTEN MATERIAL FROM YOUR DOCTOR OR PHARMACY?: ALWAYS

## 2025-06-25 SDOH — ECONOMIC STABILITY: HOUSING INSECURITY: IN THE LAST 12 MONTHS, WAS THERE A TIME WHEN YOU WERE NOT ABLE TO PAY THE MORTGAGE OR RENT ON TIME?: NO

## 2025-06-25 SDOH — SOCIAL STABILITY: SOCIAL NETWORK: HOW OFTEN DO YOU ATTEND CHURCH OR RELIGIOUS SERVICES?: NEVER

## 2025-06-25 SDOH — HEALTH STABILITY: MENTAL HEALTH: HOW OFTEN DO YOU HAVE A DRINK CONTAINING ALCOHOL?: NEVER

## 2025-06-25 SDOH — ECONOMIC STABILITY: FOOD INSECURITY: WITHIN THE PAST 12 MONTHS, YOU WORRIED THAT YOUR FOOD WOULD RUN OUT BEFORE YOU GOT THE MONEY TO BUY MORE.: NEVER TRUE

## 2025-06-25 SDOH — ECONOMIC STABILITY: HOUSING INSECURITY: IN THE PAST 12 MONTHS, HOW MANY TIMES HAVE YOU MOVED WHERE YOU WERE LIVING?: 0

## 2025-06-25 SDOH — HEALTH STABILITY: PHYSICAL HEALTH: ON AVERAGE, HOW MANY DAYS PER WEEK DO YOU ENGAGE IN MODERATE TO STRENUOUS EXERCISE (LIKE A BRISK WALK)?: 0 DAYS

## 2025-06-25 SDOH — ECONOMIC STABILITY: INCOME INSECURITY: IN THE PAST 12 MONTHS HAS THE ELECTRIC, GAS, OIL, OR WATER COMPANY THREATENED TO SHUT OFF SERVICES IN YOUR HOME?: NO

## 2025-06-25 SDOH — SOCIAL STABILITY: SOCIAL NETWORK: HOW OFTEN DO YOU ATTEND MEETINGS OF THE CLUBS OR ORGANIZATIONS YOU BELONG TO?: NEVER

## 2025-06-25 SDOH — SOCIAL STABILITY: SOCIAL INSECURITY
WITHIN THE LAST YEAR, HAVE YOU BEEN RAPED OR FORCED TO HAVE ANY KIND OF SEXUAL ACTIVITY BY YOUR PARTNER OR EX-PARTNER?: NO

## 2025-06-25 SDOH — ECONOMIC STABILITY: HOUSING INSECURITY: AT ANY TIME IN THE PAST 12 MONTHS, WERE YOU HOMELESS OR LIVING IN A SHELTER (INCLUDING NOW)?: NO

## 2025-06-25 SDOH — SOCIAL STABILITY: SOCIAL NETWORK: HOW OFTEN DO YOU GET TOGETHER WITH FRIENDS OR RELATIVES?: MORE THAN THREE TIMES A WEEK

## 2025-06-25 SDOH — ECONOMIC STABILITY: TRANSPORTATION INSECURITY: IN THE PAST 12 MONTHS, HAS LACK OF TRANSPORTATION KEPT YOU FROM MEDICAL APPOINTMENTS OR FROM GETTING MEDICATIONS?: NO

## 2025-06-25 SDOH — SOCIAL STABILITY: SOCIAL INSECURITY: WITHIN THE LAST YEAR, HAVE YOU BEEN HUMILIATED OR EMOTIONALLY ABUSED IN OTHER WAYS BY YOUR PARTNER OR EX-PARTNER?: NO

## 2025-06-25 SDOH — HEALTH STABILITY: MENTAL HEALTH: HOW OFTEN DO YOU HAVE SIX OR MORE DRINKS ON ONE OCCASION?: NEVER

## 2025-06-25 ASSESSMENT — ENCOUNTER SYMPTOMS
WEAKNESS: 1
ABDOMINAL PAIN: 0
APPETITE CHANGE: 0
ABDOMINAL DISTENTION: 0
DYSURIA: 0
AGITATION: 1
SHORTNESS OF BREATH: 0
BACK PAIN: 1
FLANK PAIN: 0

## 2025-06-25 ASSESSMENT — COGNITIVE AND FUNCTIONAL STATUS - GENERAL
TURNING FROM BACK TO SIDE WHILE IN FLAT BAD: A LOT
EATING MEALS: A LOT
WALKING IN HOSPITAL ROOM: A LOT
DRESSING REGULAR LOWER BODY CLOTHING: A LOT
DAILY ACTIVITIY SCORE: 12
CLIMB 3 TO 5 STEPS WITH RAILING: A LOT
STANDING UP FROM CHAIR USING ARMS: A LOT
MOVING TO AND FROM BED TO CHAIR: A LOT
HELP NEEDED FOR BATHING: A LOT
DRESSING REGULAR UPPER BODY CLOTHING: A LOT
TOILETING: A LOT
MOBILITY SCORE: 13
PERSONAL GROOMING: A LOT
MOVING FROM LYING ON BACK TO SITTING ON SIDE OF FLAT BED WITH BEDRAILS: A LITTLE

## 2025-06-25 ASSESSMENT — ACTIVITIES OF DAILY LIVING (ADL)
PATIENT'S MEMORY ADEQUATE TO SAFELY COMPLETE DAILY ACTIVITIES?: YES
LACK_OF_TRANSPORTATION: NO
JUDGMENT_ADEQUATE_SAFELY_COMPLETE_DAILY_ACTIVITIES: YES
ADEQUATE_TO_COMPLETE_ADL: YES
LACK_OF_TRANSPORTATION: NO

## 2025-06-25 ASSESSMENT — PAIN SCALES - GENERAL
PAINLEVEL_OUTOF10: 0 - NO PAIN
PAINLEVEL_OUTOF10: 0 - NO PAIN

## 2025-06-25 ASSESSMENT — LIFESTYLE VARIABLES
AUDIT-C TOTAL SCORE: 0
SKIP TO QUESTIONS 9-10: 1

## 2025-06-25 ASSESSMENT — PAIN - FUNCTIONAL ASSESSMENT
PAIN_FUNCTIONAL_ASSESSMENT: 0-10
PAIN_FUNCTIONAL_ASSESSMENT: 0-10

## 2025-06-25 NOTE — ED PROVIDER NOTES
HPI   Chief Complaint   Patient presents with    Extremity Weakness       HPI  This is a 85-year-old female presenting emergency room for evaluation of stroke.  Patient's last known well is unknown.  She reportedly was in bed all morning and afternoon until she got up around 12 PM.  When the aide got her up at 12 PM she noticed right sided weakness.. She has a history of lacunar infarct in December 2024.  She is currently on Plavix.  Upon arrival she notes weakness in the right side of her body, no complaints of headache, visual changes, sensory changes, nausea or vomiting, no reported chest pain or shortness of breath.    Please see HPI for pertinent positive and negative ROS.      Patient History   Medical History[1]  Surgical History[2]  Family History[3]  Social History[4]    Physical Exam   ED Triage Vitals   Temp Pulse Resp BP   -- -- -- --      SpO2 Temp src Heart Rate Source Patient Position   -- -- -- --      BP Location FiO2 (%)     -- --       Physical Exam  GENERAL APPEARANCE: Awake and alert. No acute respiratory distress.   VITAL SIGNS: As per the nurses' triage record.  HEENT: Normocephalic, atraumatic. Extraocular muscles are intact. Conjunctiva are pink. Negative scleral icterus. Mucous membranes are moist. Tongue in the midline. Oropharynx clear, uvula midline.   NECK: Soft, nontender and supple, full gross ROM, no meningeal signs.  CHEST: Nontender to palpation. Clear to auscultation bilaterally. No rales, rhonchi, or wheezing. Symmetric rise and fall of chest wall.   HEART: Clear S1 and S2. Regular rate and rhythm. No murmurs appreciated on auscultation.  Strong and equal pulses in the extremities.  ABDOMEN: Soft, nontender, nondistended, positive bowel sounds, no palpable masses.  MUSCULOSKELETAL: The calves are nontender to palpation. Full gross active range of motion. Ambulating on own with no acute difficulties  NEUROLOGICAL: Awake, alert and oriented x person.   NIH stroke scale score  6  IMMUNOLOGICAL: No lymphatic streaking noted  DERMATOLOGIC: Warm and dry   PYSCH: Cooperative with appropriate mood and affect.    ED Course & MDM   ED Course as of 06/25/25 1608   Wed Jun 25, 2025   1319 Did speak with Dr. Ford who is going to assess patient in room via telestroke cart.  Discussed patient's history and physical exam findings. [SH]   1322 EKG personally interpreted by me performed at 1319  Sinus rhythm with occasional PVC ventricular rate 86 normal axis intervals no acute ischemic changes [EF]      ED Course User Index  [EF] Aminata Sexton DO  [SH] Pattie Avila PA-C         Diagnoses as of 06/25/25 1608   Cerebrovascular accident (CVA), unspecified mechanism (Multi)                 No data recorded     [place] Coma Scale Score: 14 (06/25/25 1326 : Corazon Uriostegui RN)       NIH Stroke Scale: 6 (06/25/25 1305 : Pattie Avila PA-C)                   Medical Decision Making  Parts of this chart have been completed using voice recognition software. Please excuse any errors of transcription.  My thought process and reason for plan has been formulated from the time that I saw the patient until the time of disposition and is not specific to one specific moment during their visit and furthermore my MDM encompasses this entire chart and not only this text box.      HPI: Detailed above.    Exam: A medically appropriate exam performed, outlined above, given the known history and presentation.    History obtained from: EMS and patient    EKG: See my supervising physicians EKG interpretation    Medications given during visit:  Medications   aspirin tablet 325 mg (has no administration in time range)   iohexol (OMNIPaque) 350 mg iodine/mL solution 50 mL (50 mL intravenous Given 6/25/25 1315)        Diagnostic/tests  Labs Reviewed   CBC WITH AUTO DIFFERENTIAL - Abnormal       Result Value    WBC 16.1 (*)     nRBC 0.0      RBC 4.34      Hemoglobin 12.6      Hematocrit 38.3      MCV 88      MCH 29.0       MCHC 32.9      RDW 15.4 (*)     Platelets 406      Neutrophils % 79.2      Immature Granulocytes %, Automated 0.7      Lymphocytes % 12.0      Monocytes % 6.8      Eosinophils % 0.8      Basophils % 0.5      Neutrophils Absolute 12.74 (*)     Immature Granulocytes Absolute, Automated 0.12      Lymphocytes Absolute 1.94      Monocytes Absolute 1.10 (*)     Eosinophils Absolute 0.13      Basophils Absolute 0.08     COMPREHENSIVE METABOLIC PANEL - Abnormal    Glucose 143 (*)     Sodium 135 (*)     Potassium 4.2      Chloride 100      Bicarbonate 28      Anion Gap 11      Urea Nitrogen 18      Creatinine 0.87      eGFR 65      Calcium 10.3      Albumin 3.5      Alkaline Phosphatase 87      Total Protein 7.5      AST 23      Bilirubin, Total 0.6      ALT 16     POCT GLUCOSE - Abnormal    POCT Glucose 178 (*)    POCT GLUCOSE METER - Abnormal    POCT Glucose 178 (*)    TROPONIN I, HIGH SENSITIVITY - Normal    Troponin I, High Sensitivity 11      Narrative:     Less than 99th percentile of normal range cutoff-  Female and children under 18 years old <14 ng/L; Male <21 ng/L: Negative  Repeat testing should be performed if clinically indicated.     Female and children under 18 years old 14-50 ng/L; Male 21-50 ng/L:  Consistent with possible cardiac damage and possible increased clinical   risk. Serial measurements may help to assess extent of myocardial damage.     >50 ng/L: Consistent with cardiac damage, increased clinical risk and  myocardial infarction. Serial measurements may help assess extent of   myocardial damage.      NOTE: Children less than 1 year old may have higher baseline troponin   levels and results should be interpreted in conjunction with the overall   clinical context.     NOTE: Troponin I testing is performed using a different   testing methodology at [facility] than at other   [facility] hospitals. Direct result comparisons should only   be made within the same method.   PROTIME-INR - Normal     Protime 12.0      INR 1.1      Narrative:     INR Therapeutic Range: 2.0-3.5   APTT - Normal    aPTT 30.4        CT brain attack angio head and neck W and WO IV contrast   Final Result   1. Moderate to severe focal narrowing of the distal A2 segment of the   left CHRIS.   2. Moderate focal narrowing of the P2 segment of the left PCA and   severe focal narrowing of the P2 segment of the right PCA.   3. Intradural segment of the right vertebral artery is not   visualized, can be hypoplastic or due to chronic occlusion.   4. Approximately 40% right anteriorly% left narrowing of the internal   carotid arteries in the neck.        Recommendation: If clinically concerned for acute ischemic infarct,   further evaluation with MR of the brain without contrast is   recommended.        MACRO:   Critical Finding:  See findings. Notification was initiated on   6/25/2025 at 1:28 pm by  Shaq Cardona.  (**-OCF-**)        Signed by: Shaq Cardona 6/25/2025 1:28 PM   Dictation workstation:   [id number]      CT brain attack head wo IV contrast   Final Result   Age related changes and remote left thalamic infarct without acute   intracranial process.        The emergency department was notified about the findings by secure   message 1320 hours.        Signed by: David Zamora 6/25/2025 1:19 PM   Dictation workstation:   [id number]           Considerations/further MDM:  Patient was seen in conjucntion with my supervising physician,  Dr. Lejeune. Please refer to his note.      Last Known Well Time: 1200      Interval: Baseline  Time: 4:08 PM  Person Administering Scale: Pattie Avila PA-C     1a  Level of consciousness: 0=alert; keenly responsive   1b. LOC questions:  2=Performs neither task correctly   1c. LOC commands: 0=Performs both tasks correctly   2.  Best Gaze: 0=normal   3. Visual: 0=No visual loss   4. Facial Palsy: 0=Normal symmetric movement   5a. Motor left arm: 0=No drift, limb holds 90 (or 45) degrees for full 10  seconds   5b.  Motor right arm: 2=Some effort against gravity, limb cannot get to or maintain (if cured) 90 (or 45) degrees, drifts down to bed, but has some effort against gravity   6a. motor left le=No drift, limb holds 90 (or 45) degrees for full 10 seconds   6b  Motor right le=Some effort against gravity, limb cannot get to or maintain (if cured) 90 (or 45) degrees, drifts down to bed, but has some effort against gravity   7. Limb Ataxia: 0=Absent   8.  Sensory: 0=Normal; no sensory loss   9. Best Language:  0=No aphasia, normal   10. Dysarthria: 0=Normal   11. Extinction and Inattention: 0=No abnormality   12. Distal motor function: 0=Normal     Total:   6         VAN: VAN: Negative           Independent Interpretation of Studies: I independently interpreted the CT head and see No obvious evidence of intracranial hemorrhage    IV Thrombolysis:    No Thrombolysis contraindication reason: Time from Last Known Well (or stroke onset) is >4.5 hours        Case was discussed with telestroke neurologist, Dr. Ford who does not recommend TNK as we are not certain of patient's last known well.  She does recommend admission for stroke evaluation including MRI of the brain.  Patient is already on dual antiplatelet therapy with aspirin and Plavix which she recommends continuing.    Family and patient were updated at bedside for plans and they are agreeable to this.  Labs show nonspecific leukocytosis.  Other laboratory evaluation unremarkable      Patient was admitted in stable condition to accepting physician, Dr. Velazquez.     Procedure  Procedures         [1] No past medical history on file.  [2]   Past Surgical History:  Procedure Laterality Date    ABDOMINAL AORTIC ANEURYSM REPAIR      BACK SURGERY      cervical disc surgery    CARDIAC CATHETERIZATION      with 5 total stents in  and     EYE SURGERY      cataract surgery    JOINT REPLACEMENT      KNEE ARTHROPLASTY Left    [3]   Family  History  Problem Relation Name Age of Onset    Heart attack Mother  72    Diabetes type II Brother      Heart disease Maternal Grandmother  85    Colon cancer Maternal Grandfather     [4]   Social History  Tobacco Use    Smoking status: Former     Current packs/day: 0.00     Average packs/day: 1 pack/day for 35.0 years (35.0 ttl pk-yrs)     Types: Cigarettes     Start date:      Quit date:      Years since quittin.5    Smokeless tobacco: Never   Substance Use Topics    Alcohol use: Never    Drug use: Never        Pattie Avila PA-C  25 1602

## 2025-06-25 NOTE — CONSULTS
Inpatient consult to Neuro TeleStroke  Consult performed by: Louise Ford MD  Consult ordered by: Aminata Sexton DO        Virtual Visit start time: 119am.     History Of Present Illness:  Historian: Patient, Family, and ED Provider   Nasima Nails is a 85 y.o. female presenting with R sided weakness. She was hospitalized for UTI and encephalopathy and was discharged back yesterday to nursing home. Has been in bed until nursing tried to get her up at noon and noticed that she was more weaker on R side and was not able to pivot from bed to wheel chair. Unclear time of onset. Son reports that she had new L foot drop during hospitalization and did nto have any weakness on R side 2 days ago. .  Last known well: unclear  Had stroke symptoms resolved at time of presentation: No   Current antiplatelet/anticoagulant use: Aspirin and Clopidogrel    Prior Functional Status (Modified Juwan Scale):  5 The patient has severe disability; bedridden, incontinent, requires continuous care.     Stroke Risk Factors:  Hypertension, H/O CAD, and Previous H/O Ischemic Stroke    Last Recorded Vitals:  There were no vitals taken for this visit.     NIHSS:   NIH Stroke Scale:     1A. Level of Consciousness:  Arouses to minor stimulation (+1)    1B. Ask Month and Age:  0 questions right (+2)    1C. Blink Eyes & Squeeze Hands:  Performs both tasks (0)    2. Best Gaze:  Normal (0)    3. Visual:  No visual loss (0)    4. Facial Palsy:  Normal symmetry (0)    5A. Motor - Left Arm:  No drift (0)    5B. Motor - Right Arm:  Drift (+1)    6A. Motor - Left Leg:  No effort against gravity (+3)    6B. Motor - Right Leg:  Some effort against gravity (+2)    7. Limb Ataxia:  No ataxia (0)    8. Sensory Loss:  Normal (no sensory loss) (0)    9. Best Language:  Normal (no aphasia) (0)    10. Dysarthia:  Normal (0)    11. Extinction and Inattention:  No abnormality (0)    NIH Stroke Scale:  9     Drowsy, wakes up to minimal stimulation,  "followed simple commands. Disoriented. R hemiparesis    Relevant Results:  LABS:  No results found for: \"GLUCOSE\", \"INR\"   No results found for this or any previous visit (from the past 24 hours).     CT Head Imaging:  CTH imaging personally reviewed, showed no acute ischemic / hemorrhagic changes     CTA Head and Neck Imaging:  CTA head and neck imaging personally reviewed, no large vessel occlusion  seen. 1. Moderate to severe focal narrowing of the distal A2 segment of the  left CHRIS.  2. Moderate focal narrowing of the P2 segment of the left PCA and  severe focal narrowing of the P2 segment of the right PCA.  3. Intradural segment of the right vertebral artery is not  visualized, can be hypoplastic or due to chronic occlusion.  4. Approximately 40% right anteriorly% left narrowing of the internal  carotid arteries in the neck.          Diagnosis:  Supect Acute Ischemic Stroke    Assessment/Plan:  85 y o with h/o dementia, L thalamus stroke in 10/2024, CAD, on dual antiplatelets was noted to have new R sided weakness of unclear onset at nursing home suspicious for recurrent ischemic stroke. Stroke work up as below.    IV Thrombolysis IV Thrombolysis Checklist        IV Thrombolysis Given: No; Thrombolysis contraindication reason: Time from Last Known Well (or stroke onset) is >4.5 hours           Patient is a candidate for thrombectomy:  yes/no: No; contraindication reason: Significant pre-stroke disability (pre-stroke mRS >1) and No evidence of proximal occlusion    Additional Recommendations:  MRI Brain w/o contrast stroke protocol or repeat CTH 24 hours after initial CTH if unable to get MRI.  TTE w/ bubble study.  Lipid panel, A1c.  Continue dual antiplatelets - aspirin and plavix  Lipid Goals: education on healthy diet and statin therapy to maintain or achieve goal LDL-cholesterol < 70mg. Atorvastatin 80mg..  Blood pressure goals: avoid hypotension SBP <100 that could worsen cerebral perfusion. Ischemic " stroke- early permissive hypertension SBP < 220 mmHg with cautious inpatient lowering..  Glucose Goals: early treatment of hyperglycemia to goal glucose 140-180 mg/dl with long-term goal A1c < 7%.  NPO until nurse bedside swallow assessment.  Consider focused stroke order set.  Smoking Cessation and Education.  Assessment for Rehabilitation needs.  Patient and family education on signs and symptoms of stroke, calling 911, healthy strategies for stroke prevention.      Disposition:  Patient will remain at referring facility for further evaluation and management.    Virtual or Telephone Consent    An interactive audio and video telecommunication system which permits real time communications between the patient (at the originating site) and provider (at the distant site) was utilized to provide this telehealth service.   Verbal consent was requested and obtained from Nasima Nails on this date, 06/25/25 for a telehealth visit.      Telestroke is covered in shift work. If there are further Neurological questions or concerns please contact your regional neurologist on call during daytime hours or contact the transfer center with an ADT20 order.    Louise Ford MD

## 2025-06-25 NOTE — CARE PLAN
Pt has dementia  Son Artemio Alvarez in room and answered questions for this assessment  Mitul number is [phone]    Pt and her  live in the [facility]cl[facility] at [facility] in the Memory Care Unit.  Pt has a hx of a previous CVA, she is mostly wheelchair bound and per Artemio, she sleeps much of the time  She needs assisting with showering and dressing.  No home 02, cpap, bipap  She was discharged from [facility] yesterday; nurse found pt with right sided weakness today  Artemio said that his mother went to [facility] after her first stroke and he would like for her to return there again for rehab if needed,  Referral placed in [facility] for [place] and list of SNF given to son Artemio    DISCHARGE PLAN: TBD--DO NOT DISCHARGE PATIENT BEFORE SPEAKING WITH CARE COORDINATION; PT DOES NOT HAVE A DISCHARGE PLAN IN PLACE AT THIS TIME

## 2025-06-25 NOTE — H&P
History Of Present Illness  Nasima Nails is a 85 y.o. female PMHX lacunar infarct Dec 2024, AAA repair 2017, CAD, MI, hypertension, hyperlipidemia, hypothyroid, dementia, history of delirium and agitation with behavioral outburst/aggression, frequent UTIs, frequent falls presenting with right sided weakness noted at 12pm today when aid assisting her out of bed. CT brain reviewed in ED with tele stroke team with recommendations for admission and MRI brain to further evaluate for ischemic infarct.    Admitted 6/20-6/24 at [facility] for acute mental status change, recurrent UTI and fall. Evaluated by neurology as well for AMS: Acute process such as stroke less likely given lack of focality. I discussed this at length with son. We did discuss that stroke cannot be excluded with certainty without MRI, but overall suspicion is quite low given lack of focality. Decision was made not to pursue MRI brain since patient would have to be sedated and responded to Ativan poorly previously. This would likely prolong hospital stay. Also evaluated by infectious disease for persistent leukocytosis (15 range) and recurrent UTI. She was treated with 4 days of bactrim and stopped when repeat urine culture showed normal elder.     Sons at bedside providing HPI. Through shared decision making with [place] neuro team opted for no MRI brain earlier in week, but given new right sided weakness which is how she presented with acute CVA in December they would like to move forward with MRI brain if possible. Incontinent of urine at baseline. Her  is alive and living a the same facility as her, battling cancer and undergoing treatment currently. Three sons, two present at bedside.          Past Medical History  She has a past medical history of CAD (coronary artery disease), Hypertension, Hypothyroidism, Lacunar infarct, acute (Multi) (2024), and Recurrent UTI.    Surgical History  She has a past surgical history that includes  Eye surgery; Cardiac catheterization; Back surgery; Knee Arthroplasty (Left, 2006); Joint replacement; and Abdominal aortic aneurysm repair.     Social History  She reports that she quit smoking about 32 years ago. Her smoking use included cigarettes. She started smoking about 67 years ago. She has a 35 pack-year smoking history. She has never used smokeless tobacco. She reports that she does not drink alcohol and does not use drugs.    Family History  Family History[1]     Allergies  Cephalexin, Ciprofloxacin, Codeine, Erythromycin, Gabapentin, Oxycodone, Tramadol, Minocycline, and Penicillins    Review of Systems   Constitutional:  Negative for appetite change.   Respiratory:  Negative for shortness of breath.    Cardiovascular:  Negative for chest pain and leg swelling.   Gastrointestinal:  Negative for abdominal distention and abdominal pain.   Genitourinary:  Negative for dysuria and flank pain.   Musculoskeletal:  Positive for back pain.   Skin:  Positive for pallor.   Neurological:  Positive for weakness.   Psychiatric/Behavioral:  Positive for agitation.         Physical Exam  Vitals reviewed.   Constitutional:       General: She is not in acute distress.     Appearance: She is not toxic-appearing.   HENT:      Head: Normocephalic and atraumatic.   Eyes:      Conjunctiva/sclera: Conjunctivae normal.      Comments: Unable to assess PERRLA   Cardiovascular:      Rate and Rhythm: Normal rate and regular rhythm.      Pulses: Normal pulses.      Heart sounds: Normal heart sounds.   Pulmonary:      Effort: Pulmonary effort is normal.      Breath sounds: Normal breath sounds.   Abdominal:      General: Bowel sounds are normal.      Palpations: Abdomen is soft.      Tenderness: There is no abdominal tenderness.   Musculoskeletal:         General: No swelling.      Cervical back: Neck supple.   Skin:     General: Skin is warm and dry.      Coloration: Skin is pale. Skin is not jaundiced.      Findings: Rash present.    Neurological:      Mental Status: She is alert.      Comments: Face symmetrical  Upper strengths symmetrical  Left foot drop  Decreased motion in LLE  Alert to self  Facial sensation intact    Unable to test lower strengths or perform further neurological evaluation as patient was unwilling/unable to follow further commands          Last Recorded Vitals  BP (!) 113/91   Pulse 98   Temp 36.6 °C (97.9 °F) (Temporal)   Resp 19   Wt 61.8 kg (136 lb 3.9 oz)   SpO2 (!) 92%     Relevant Results         Assessment/Plan   Assessment & Plan      Abnormal CT  -CT scan: Moderate to severe focal narrowing of the distal A2 segment of the  left CHRIS.  2. Moderate focal narrowing of the P2 segment of the left PCA and  severe focal narrowing of the P2 segment of the right PCA.  3. Intradural segment of the right vertebral artery is not  visualized, can be hypoplastic or due to chronic occlusion.  4. Approximately 40% right anteriorly% left narrowing of the internal  carotid arteries in the neck.    Plan:  -neurology consulted  -mri brain ordered but note history of difficulty tolerating mri. Prior paradoxical effect to ativan, caused agitation  -continue plavix and asa. Lovenox for dvt ppx  -tele. Close BP monitoring. Neuro checks per protocol  -High falls risk with frequent falls in her facility    Leukocytosis  -suspect reactive to recent infection, fall, and hospitalization as well as elevations in monocytes and neutrophils  -check UA. Noted hx recurrent UTI and just treated with 4 days of bactrim in the last week during [facility]  -consider infectious disease consult pending UA results, blood culture and WBC trend  -monitor for signs of infection           Mey Ac, APRN-CNP         [1]   Family History  Problem Relation Name Age of Onset    Heart attack Mother  72    Diabetes type II Brother      Heart disease Maternal Grandmother  85    Colon cancer Maternal Grandfather

## 2025-06-25 NOTE — PROGRESS NOTES
06/25/25 1804   Physical Activity   On average, how many days per week do you engage in moderate to strenuous exercise (like a brisk walk)? 0 days   On average, how many minutes do you engage in exercise at this level? 0 min   Financial Resource Strain   How hard is it for you to pay for the very basics like food, housing, medical care, and heating? Not hard   Housing Stability   In the last 12 months, was there a time when you were not able to pay the mortgage or rent on time? N   In the past 12 months, how many times have you moved where you were living? 0   At any time in the past 12 months, were you homeless or living in a shelter (including now)? N   Transportation Needs   In the past 12 months, has lack of transportation kept you from medical appointments or from getting medications? no   In the past 12 months, has lack of transportation kept you from meetings, work, or from getting things needed for daily living? No   Food Insecurity   Within the past 12 months, you worried that your food would run out before you got the money to buy more. Never true   Within the past 12 months, the food you bought just didn't last and you didn't have money to get more. Never true   Stress   Do you feel stress - tense, restless, nervous, or anxious, or unable to sleep at night because your mind is troubled all the time - these days? Not at all   Social Connections   In a typical week, how many times do you talk on the phone with family, friends, or neighbors? Never   How often do you get together with friends or relatives? More than 3   How often do you attend Mandaeism or Church services? Never   Do you belong to any clubs or organizations such as Mandaeism groups, unions, fraternal or athletic groups, or school groups? No   How often do you attend meetings of the clubs or organizations you belong to? Never   Are you , , , , never , or living with a partner?     Intimate Partner Violence   Within the last year, have you been afraid of your partner or ex-partner? No   Within the last year, have you been humiliated or emotionally abused in other ways by your partner or ex-partner? No   Within the last year, have you been kicked, hit, slapped, or otherwise physically hurt by your partner or ex-partner? No   Within the last year, have you been raped or forced to have any kind of sexual activity by your partner or ex-partner? No   Alcohol Use   Q1: How often do you have a drink containing alcohol? Never   Q2: How many drinks containing alcohol do you have on a typical day when you are drinking? None   Q3: How often do you have six or more drinks on one occasion? Never   Utilities   In the past 12 months has the electric, gas, oil, or water company threatened to shut off services in your home? No   Health Literacy   How often do you need to have someone help you when you read instructions, pamphlets, or other written material from your doctor or pharmacy? Always   Follow-Ups   We make community resources available to all of our patients to assist with everyday needs. We may be able to connect you with those resources. Would you be interested? N

## 2025-06-25 NOTE — ED TRIAGE NOTES
BIBA for stroke-like symptoms from [facility] in [place]. Symptoms are slight facial droop and right leg weakness. Per facility they noticed this when they first got her up this morning

## 2025-06-25 NOTE — PROGRESS NOTES
06/25/25 1805   Discharge Planning   Living Arrangements Other (Comment)  (pt lives in the [facility] at [facility] with her  in the Memory care unit)   Support Systems Children   Type of Residence Assisted living   Do you have animals or pets at home? No   Care Facility Name [facility] at [facility] in the Memory Care Unit   Who is requesting discharge planning? Provider   Expected Discharge Disposition SNF   Does the patient need discharge transport arranged? Yes   RoundTrip coordination needed? Yes   Has discharge transport been arranged? No   Financial Resource Strain   How hard is it for you to pay for the very basics like food, housing, medical care, and heating? Not hard   Housing Stability   In the last 12 months, was there a time when you were not able to pay the mortgage or rent on time? N   In the past 12 months, how many times have you moved where you were living? 0   At any time in the past 12 months, were you homeless or living in a shelter (including now)? N   Transportation Needs   In the past 12 months, has lack of transportation kept you from medical appointments or from getting medications? no   In the past 12 months, has lack of transportation kept you from meetings, work, or from getting things needed for daily living? No   Patient Choice   Provider Choice list and CMS website (https://medicare.gov/care-compare#search) for post-acute Quality and Resource Measure Data were provided and reviewed with: Family   Patient / Family choosing to utilize agency / facility established prior to hospitalization No   Stroke Family Assessment   Stroke Family Assessment Needed No

## 2025-06-25 NOTE — PROGRESS NOTES
Nasima Nails is a 85 y.o. female admitted for No Principal Problem currently listed. Pharmacy reviewed the patient's [id number] medications and allergies for accuracy.    Medications ADDED:  QUEtiapine (SEROquel) 25 mg tablet (PRN)  QUEtiapine (SEROquel) 25 mg tablet (BID)  amLODIPine (Norvasc) 5 mg tablet  dextromethorphan-guaifenesin  mg/5 mL liquid  divalproex sprinkle (Depakote Sprinkle) 125 mg DR capsule  sennosides (Senokot) 8.6 mg tablet  Medications CHANGED:  cholecalciferol (Vitamin D-3) 50 mcg (2,000 units) capsule  levothyroxine (Synthroid, Levoxyl) 100 mcg tablet  Medications REMOVED:   Buspirone 5 mg tablet  Losartan 50 mg tablet  Metoprolol tartrate 25 mg tablet     The list below reflects the updated PTA list. Comments regarding how patient may be taking medications differently can be found in the Admit Orders Activity  Prior to Admission Medications   Prescriptions Last Dose Informant   QUEtiapine (SEROquel) 25 mg tablet Unknown Other   Sig: Take 1.5 tablets (37.5 mg) by mouth 2 times a day.   QUEtiapine (SEROquel) 25 mg tablet Unknown Other   Sig: Take 1 tablet (25 mg) by mouth every 6 hours if   needed (agitation).   acetaminophen (Tylenol) 325 mg tablet Unknown Other   Sig: Take 2 tablets (650 mg) by mouth every 12 hours if needed.   amLODIPine (Norvasc) 5 mg tablet Unknown Other   Sig: Take 1 tablet (5 mg) by mouth once daily. *Hold if SBP <110*   aspirin 81 mg EC tablet Unknown Other   Sig: Take 1 tablet (81 mg) by mouth once daily.   atorvastatin (Lipitor) 80 mg tablet Unknown Other   Sig: Take 1 tablet (80 mg) by mouth once daily.   cetirizine (ZyrTEC) 10 mg tablet Unknown Other   Sig: Take 1 tablet (10 mg) by mouth once daily.   cholecalciferol (Vitamin D-3) 50 mcg (2,000 units) capsule Unknown Other   Sig: Take 1 capsule (50 mcg) by mouth once daily.   clopidogrel (Plavix) 75 mg tablet Unknown Other   Sig: Take 1 tablet (75 mg) by mouth once daily.    dextromethorphan-guaifenesin  mg/5 mL liquid Unknown Other   Sig: Take 10 mL by mouth every 6 hours if needed (cough).   divalproex sprinkle (Depakote Sprinkle) 125 mg DR capsule Unknown Other   Sig: Take 2 capsules (250 mg) by mouth 2 times a day.   hydrOXYzine HCL (Atarax) 25 mg tablet Unknown Other   Sig: Take 1 tablet (25 mg) by mouth every 6 hours if   needed for itching or anxiety.   levothyroxine (Synthroid, Levoxyl) 100 mcg tablet Unknown Other   Sig: Take 1 tablet (100 mcg) by mouth once daily.   melatonin 10 mg tablet Unknown Other   Sig: Take 1 tablet (10 mg) by mouth as needed at bedtime   (insomnia).   mirtazapine (Remeron) 15 mg tablet Unknown Other   Sig: Take 1 tablet (15 mg) by mouth once daily at bedtime.   multivitamin tablet Unknown Other   Sig: Take 1 tablet by mouth once daily.   sennosides (Senokot) 8.6 mg tablet Unknown Other   Sig: Take 2 tablets (17.2 mg) by mouth once daily at bedtime.      Facility-Administered Medications: None        The list below reflects the updated allergy list. Please review each documented allergy for additional clarification and justification.  Allergies  Reviewed by Samuel Alonso on 6/25/2025        Severity Reactions Comments    Cephalexin Not Specified Hives, Itching     Ciprofloxacin Not Specified Unknown     Codeine Not Specified Hives, Itching     Erythromycin Not Specified Hives, Itching     Gabapentin Not Specified Other Severe lethargy/severe behavior problems    Oxycodone Not Specified Hives, Itching     Tramadol Not Specified Hives, Itching     Minocycline Low Rash     Penicillins Low Hives, Rash               Sources used to complete the med history include: PTA medication list has been updated as appears on: [facility] Medication Orders Sheet 6/25/25 12:32 PM      Samuel Alonso, Lexie  Please reach out via [employer] Secure Chat for questions

## 2025-06-25 NOTE — PROGRESS NOTES
06/25/25 1808   [facility] Disability Status   Are you deaf or do you have serious difficulty hearing? N   Are you blind or do you have serious difficulty seeing, even when wearing glasses? N   Because of a physical, mental, or emotional condition, do you have serious difficulty concentrating, remembering, or making decisions? (5 years old or older) Y   Do you have serious difficulty walking or climbing stairs? Y   Do you have serious difficulty dressing or bathing? Y   Because of a physical, mental, or emotional condition, do you have serious difficulty doing errands alone such as visiting the doctor? Y

## 2025-06-26 ENCOUNTER — APPOINTMENT (OUTPATIENT)
Dept: RADIOLOGY | Facility: HOSPITAL | Age: 85
End: 2025-06-26
Payer: MEDICARE

## 2025-06-26 LAB
ANION GAP SERPL CALCULATED.3IONS-SCNC: 12 MMOL/L (ref 10–20)
ATRIAL RATE: 86 BPM
BASOPHILS # BLD AUTO: 0.07 X10*3/UL (ref 0–0.1)
BASOPHILS NFR BLD AUTO: 0.6 %
BUN SERPL-MCNC: 20 MG/DL (ref 6–23)
CALCIUM SERPL-MCNC: 10.1 MG/DL (ref 8.6–10.3)
CHLORIDE SERPL-SCNC: 103 MMOL/L (ref 98–107)
CO2 SERPL-SCNC: 27 MMOL/L (ref 21–32)
CREAT SERPL-MCNC: 0.75 MG/DL (ref 0.5–1.05)
EGFRCR SERPLBLD CKD-EPI 2021: 78 ML/MIN/1.73M*2
EOSINOPHIL # BLD AUTO: 0.28 X10*3/UL (ref 0–0.4)
EOSINOPHIL NFR BLD AUTO: 2.4 %
ERYTHROCYTE [DISTWIDTH] IN BLOOD BY AUTOMATED COUNT: 15.7 % (ref 11.5–14.5)
EST. AVERAGE GLUCOSE BLD GHB EST-MCNC: 128 MG/DL
GLUCOSE BLD MANUAL STRIP-MCNC: 117 MG/DL (ref 74–99)
GLUCOSE BLD MANUAL STRIP-MCNC: 141 MG/DL (ref 74–99)
GLUCOSE BLD MANUAL STRIP-MCNC: 155 MG/DL (ref 74–99)
GLUCOSE BLD MANUAL STRIP-MCNC: 91 MG/DL (ref 74–99)
GLUCOSE SERPL-MCNC: 91 MG/DL (ref 74–99)
HBA1C MFR BLD: 6.1 % (ref ?–5.7)
HCT VFR BLD AUTO: 39.1 % (ref 36–46)
HGB BLD-MCNC: 13.3 G/DL (ref 12–16)
IMM GRANULOCYTES # BLD AUTO: 0.1 X10*3/UL (ref 0–0.5)
IMM GRANULOCYTES NFR BLD AUTO: 0.9 % (ref 0–0.9)
LYMPHOCYTES # BLD AUTO: 2.45 X10*3/UL (ref 0.8–3)
LYMPHOCYTES NFR BLD AUTO: 21.3 %
MCH RBC QN AUTO: 29.7 PG (ref 26–34)
MCHC RBC AUTO-ENTMCNC: 34 G/DL (ref 32–36)
MCV RBC AUTO: 87 FL (ref 80–100)
MONOCYTES # BLD AUTO: 0.89 X10*3/UL (ref 0.05–0.8)
MONOCYTES NFR BLD AUTO: 7.7 %
NEUTROPHILS # BLD AUTO: 7.71 X10*3/UL (ref 1.6–5.5)
NEUTROPHILS NFR BLD AUTO: 67.1 %
NRBC BLD-RTO: 0 /100 WBCS (ref 0–0)
P AXIS: 66 DEGREES
P OFFSET: 188 MS
P ONSET: 128 MS
PLATELET # BLD AUTO: 404 X10*3/UL (ref 150–450)
POTASSIUM SERPL-SCNC: 4 MMOL/L (ref 3.5–5.3)
PR INTERVAL: 164 MS
Q ONSET: 210 MS
QRS COUNT: 15 BEATS
QRS DURATION: 82 MS
QT INTERVAL: 378 MS
QTC CALCULATION(BAZETT): 452 MS
QTC FREDERICIA: 426 MS
R AXIS: 46 DEGREES
RBC # BLD AUTO: 4.48 X10*6/UL (ref 4–5.2)
SODIUM SERPL-SCNC: 138 MMOL/L (ref 136–145)
T AXIS: 78 DEGREES
T OFFSET: 399 MS
VENTRICULAR RATE: 86 BPM
WBC # BLD AUTO: 11.5 X10*3/UL (ref 4.4–11.3)

## 2025-06-26 PROCEDURE — 85025 COMPLETE CBC W/AUTO DIFF WBC: CPT | Performed by: NURSE PRACTITIONER

## 2025-06-26 PROCEDURE — 97166 OT EVAL MOD COMPLEX 45 MIN: CPT | Mod: GO

## 2025-06-26 PROCEDURE — 2060000001 HC INTERMEDIATE ICU ROOM DAILY

## 2025-06-26 PROCEDURE — 82947 ASSAY GLUCOSE BLOOD QUANT: CPT

## 2025-06-26 PROCEDURE — 2500000002 HC RX 250 W HCPCS SELF ADMINISTERED DRUGS (ALT 637 FOR MEDICARE OP, ALT 636 FOR OP/ED): Performed by: NURSE PRACTITIONER

## 2025-06-26 PROCEDURE — 70450 CT HEAD/BRAIN W/O DYE: CPT | Performed by: RADIOLOGY

## 2025-06-26 PROCEDURE — 99223 1ST HOSP IP/OBS HIGH 75: CPT | Performed by: STUDENT IN AN ORGANIZED HEALTH CARE EDUCATION/TRAINING PROGRAM

## 2025-06-26 PROCEDURE — 80048 BASIC METABOLIC PNL TOTAL CA: CPT | Performed by: NURSE PRACTITIONER

## 2025-06-26 PROCEDURE — 36415 COLL VENOUS BLD VENIPUNCTURE: CPT | Performed by: NURSE PRACTITIONER

## 2025-06-26 PROCEDURE — 2500000004 HC RX 250 GENERAL PHARMACY W/ HCPCS (ALT 636 FOR OP/ED): Performed by: NURSE PRACTITIONER

## 2025-06-26 PROCEDURE — 97162 PT EVAL MOD COMPLEX 30 MIN: CPT | Mod: GP

## 2025-06-26 PROCEDURE — 70450 CT HEAD/BRAIN W/O DYE: CPT

## 2025-06-26 PROCEDURE — 2500000001 HC RX 250 WO HCPCS SELF ADMINISTERED DRUGS (ALT 637 FOR MEDICARE OP): Performed by: NURSE PRACTITIONER

## 2025-06-26 RX ADMIN — ASPIRIN 81 MG: 81 TABLET, COATED ORAL at 09:37

## 2025-06-26 RX ADMIN — DIVALPROEX SODIUM 250 MG: 125 CAPSULE, COATED PELLETS ORAL at 20:04

## 2025-06-26 RX ADMIN — LEVOTHYROXINE SODIUM 100 MCG: 0.1 TABLET ORAL at 06:49

## 2025-06-26 RX ADMIN — STANDARDIZED SENNA CONCENTRATE 17.2 MG: 8.6 TABLET ORAL at 20:04

## 2025-06-26 RX ADMIN — MIRTAZAPINE 15 MG: 15 TABLET, FILM COATED ORAL at 20:04

## 2025-06-26 RX ADMIN — AMLODIPINE BESYLATE 5 MG: 5 TABLET ORAL at 09:37

## 2025-06-26 RX ADMIN — DIVALPROEX SODIUM 250 MG: 125 CAPSULE, COATED PELLETS ORAL at 09:37

## 2025-06-26 RX ADMIN — CLOPIDOGREL 75 MG: 75 TABLET ORAL at 09:38

## 2025-06-26 RX ADMIN — ENOXAPARIN SODIUM 40 MG: 100 INJECTION SUBCUTANEOUS at 20:04

## 2025-06-26 RX ADMIN — ATORVASTATIN CALCIUM 80 MG: 80 TABLET, FILM COATED ORAL at 20:04

## 2025-06-26 SDOH — SOCIAL STABILITY: SOCIAL INSECURITY: WERE YOU ABLE TO COMPLETE ALL THE BEHAVIORAL HEALTH SCREENINGS?: NO

## 2025-06-26 ASSESSMENT — COGNITIVE AND FUNCTIONAL STATUS - GENERAL
WALKING IN HOSPITAL ROOM: TOTAL
MOVING FROM LYING ON BACK TO SITTING ON SIDE OF FLAT BED WITH BEDRAILS: TOTAL
DRESSING REGULAR UPPER BODY CLOTHING: A LOT
TURNING FROM BACK TO SIDE WHILE IN FLAT BAD: TOTAL
MOBILITY SCORE: 8
MOVING TO AND FROM BED TO CHAIR: TOTAL
EATING MEALS: A LOT
PERSONAL GROOMING: A LOT
TURNING FROM BACK TO SIDE WHILE IN FLAT BAD: TOTAL
HELP NEEDED FOR BATHING: A LOT
DAILY ACTIVITIY SCORE: 11
CLIMB 3 TO 5 STEPS WITH RAILING: TOTAL
WALKING IN HOSPITAL ROOM: TOTAL
PERSONAL GROOMING: A LOT
TOILETING: A LOT
PATIENT BASELINE BEDBOUND: YES
TOILETING: TOTAL
MOVING FROM LYING ON BACK TO SITTING ON SIDE OF FLAT BED WITH BEDRAILS: A LITTLE
HELP NEEDED FOR BATHING: A LOT
DRESSING REGULAR UPPER BODY CLOTHING: A LOT
CLIMB 3 TO 5 STEPS WITH RAILING: TOTAL
MOVING TO AND FROM BED TO CHAIR: TOTAL
MOVING FROM LYING ON BACK TO SITTING ON SIDE OF FLAT BED WITH BEDRAILS: A LITTLE
EATING MEALS: A LOT
TURNING FROM BACK TO SIDE WHILE IN FLAT BAD: TOTAL
PERSONAL GROOMING: A LOT
CLIMB 3 TO 5 STEPS WITH RAILING: TOTAL
MOBILITY SCORE: 6
STANDING UP FROM CHAIR USING ARMS: TOTAL
MOBILITY SCORE: 8
STANDING UP FROM CHAIR USING ARMS: TOTAL
DRESSING REGULAR LOWER BODY CLOTHING: A LOT
DAILY ACTIVITIY SCORE: 12
DAILY ACTIVITIY SCORE: 12
MOVING TO AND FROM BED TO CHAIR: TOTAL
EATING MEALS: A LITTLE
STANDING UP FROM CHAIR USING ARMS: TOTAL
DRESSING REGULAR UPPER BODY CLOTHING: A LOT
WALKING IN HOSPITAL ROOM: TOTAL
HELP NEEDED FOR BATHING: A LOT
DRESSING REGULAR LOWER BODY CLOTHING: TOTAL
TOILETING: A LOT
DRESSING REGULAR LOWER BODY CLOTHING: A LOT

## 2025-06-26 ASSESSMENT — ACTIVITIES OF DAILY LIVING (ADL)
DRESSING YOURSELF: NEEDS ASSISTANCE
GROOMING: NEEDS ASSISTANCE
JUDGMENT_ADEQUATE_SAFELY_COMPLETE_DAILY_ACTIVITIES: NO
ADEQUATE_TO_COMPLETE_ADL: YES
HEARING - RIGHT EAR: DIFFICULTY WITH NOISE
HEARING - LEFT EAR: DIFFICULTY WITH NOISE
BATHING: NEEDS ASSISTANCE
WALKS IN HOME: NEEDS ASSISTANCE
FEEDING YOURSELF: NEEDS ASSISTANCE
ADL_ASSISTANCE: NEEDS ASSISTANCE
BATHING_ASSISTANCE: MAXIMAL
ADL_ASSISTANCE: NEEDS ASSISTANCE
PATIENT'S MEMORY ADEQUATE TO SAFELY COMPLETE DAILY ACTIVITIES?: NO
TOILETING: NEEDS ASSISTANCE

## 2025-06-26 ASSESSMENT — LIFESTYLE VARIABLES
SKIP TO QUESTIONS 9-10: 1
AUDIT-C TOTAL SCORE: 0
HOW OFTEN DO YOU HAVE 6 OR MORE DRINKS ON ONE OCCASION: NEVER
HOW MANY STANDARD DRINKS CONTAINING ALCOHOL DO YOU HAVE ON A TYPICAL DAY: PATIENT DOES NOT DRINK
HOW OFTEN DO YOU HAVE A DRINK CONTAINING ALCOHOL: NEVER
AUDIT-C TOTAL SCORE: 0

## 2025-06-26 ASSESSMENT — PAIN - FUNCTIONAL ASSESSMENT
PAIN_FUNCTIONAL_ASSESSMENT: 0-10

## 2025-06-26 ASSESSMENT — PAIN SCALES - GENERAL
PAINLEVEL_OUTOF10: 2
PAINLEVEL_OUTOF10: 0 - NO PAIN
PAINLEVEL_OUTOF10: 0 - NO PAIN

## 2025-06-26 NOTE — PROGRESS NOTES
Nasima Nails is a 85 y.o. female on day 1 of admission presenting with Cerebrovascular accident (CVA), unspecified mechanism (Multi).      Subjective   Alert pleasant, full code, son Artemio at bedside       Objective     Last Recorded Vitals  /59 (BP Location: Left arm, Patient Position: Lying)   Pulse 73   Temp 35.9 °C (96.6 °F) (Temporal)   Resp 17   Wt 59.2 kg (130 lb 8.2 oz)   SpO2 91%   Intake/Output last 3 Shifts:    Intake/Output Summary (Last 24 hours) at 6/26/2025 1109  Last data filed at 6/26/2025 0500  Gross per 24 hour   Intake --   Output 100 ml   Net -100 ml       Admission Weight  Weight: 61.8 kg (136 lb 3.9 oz) (06/25/25 1323)    Daily Weight  06/26/25 : 59.2 kg (130 lb 8.2 oz)    Image Results  ECG 12 lead  Sinus rhythm with occasional Premature ventricular complexes  Otherwise normal ECG  No previous ECGs available  CT brain attack angio head and neck W and WO IV contrast  Narrative: Interpreted By:  Shaq Cardona,   STUDY:  CT BRAIN ATTACK ANGIO HEAD AND NECK W AND WO IV CONTRAST;  6/25/2025  1:11 pm      INDICATION:  Signs/Symptoms:NIH score 6.          COMPARISON:  None.      ACCESSION NUMBER(S):  [id number]      ORDERING CLINICIAN:  MALINDA MORENO      TECHNIQUE:  Unenhanced CT images of the head were obtained. Subsequently, 50 ML  of Omnipaque 350 was administered intravenously and axial images of  the head and neck were acquired.  Coronal, sagittal, and 3-D  reconstructions were provided for review.      FINDINGS:  CTA HEAD FINDINGS:      Anterior circulation: There is atherosclerotic calcification of the  cavernous and supraclinoid segment of the bilateral internal carotid  artery with mild focal narrowing. There is moderate to severe focal  narrowing of the distal A2 segment of the left CHRIS (series 5, image  802). Right CHRIS and bilateral MCAs are patent.      Posterior circulation: There is moderate focal narrowing of the P2  segment of the left PCA (series 5, image 736)  and severe focal  narrowing of the P2 segment of the right PCA (series 5, image 734).  Intradural segment of the right vertebral artery is not visualized,  can be hypoplastic or due to chronic occlusion. The basilar artery is  patent.      Scratch the CTA NECK FINDINGS:      Right carotid vessels: Atherosclerotic calcification of the right  common carotid artery, extending to the right proximal internal  carotid artery resulting in narrowing of the lumen measuring 3 mm in  compared to distal segment measuring up to 5 mm, compatible with 40%  stenosis.      Left carotid vessels: Atherosclerotic calcification of the left  common carotid artery, extending to the proximal left internal  carotid artery, resulting in narrowing of the lumen measuring 3.8 mm  in compared to distal segment measuring up to 5 mm, compatible with  30% stenosis.      Vertebral vessels: Dominant left vertebral artery. The visualized  segments of the cervical vertebral arteries are normal in caliber.      Impression: 1. Moderate to severe focal narrowing of the distal A2 segment of the  left CHRIS.  2. Moderate focal narrowing of the P2 segment of the left PCA and  severe focal narrowing of the P2 segment of the right PCA.  3. Intradural segment of the right vertebral artery is not  visualized, can be hypoplastic or due to chronic occlusion.  4. Approximately 40% right anteriorly% left narrowing of the internal  carotid arteries in the neck.      Recommendation: If clinically concerned for acute ischemic infarct,  further evaluation with MR of the brain without contrast is  recommended.      MACRO:  Critical Finding:  See findings. Notification was initiated on  6/25/2025 at 1:28 pm by  Shaq Cardona.  (**-OCF-**)      Signed by: Shaq Cardona 6/25/2025 1:28 PM  Dictation workstation:   [id number]  CT brain attack head wo IV contrast  Narrative: Interpreted By:  David Zamora,   STUDY:  CT BRAIN ATTACK HEAD WO IV CONTRAST; 6/25/2025 1:09 pm       INDICATION:  Signs/Symptoms:Stroke Evaluation      COMPARISON:  None      ACCESSION NUMBER(S):  [id number]      ORDERING CLINICIAN:  BRIAN RAMOS      TECHNIQUE:  Axial images were obtained through the brain. No IV contrast was  administered.      All CT examinations are performed with one or more of the following  dose reduction techniques: Automated Exposure Control, adjustment of  mA and/or kV according to patient size, or use of iterative  reconstruction techniques.      FINDINGS:  The ventricles are enlarged but midline in position, with  proportional prominence of the sulci, due to atrophy. Patchy  periventricular hypodensities are present suggestive of small vessel  ischemic white matter disease. Remote lacunar infarct is noted in the  left thalamus. There is no intracranial hemorrhage.  No mass or mass effect is seen.  The osseus structures are unremarkable.      Impression: Age related changes and remote left thalamic infarct without acute  intracranial process.      The emergency department was notified about the findings by secure  message 1320 hours.      Signed by: David Zamora 6/25/2025 1:19 PM  Dictation workstation:   [id number]      Physical Exam/right sided weak    Relevant Results                              Assessment & Plan  Frequent falls    Cognitive impairment    Right sided weakness    Recurrent UTI    Remote new stroke right sided weak, not walking/mri brain pending           Fiona Velazquez MD

## 2025-06-26 NOTE — PROGRESS NOTES
Physical Therapy    Physical Therapy Evaluation    Patient Name: Nasima Nails  MRN: [id number]  Department: [facility]  Room: 13/13-A  Today's Date: 6/26/2025   Time Calculation  Start Time: 0855  Stop Time: 0907  Time Calculation (min): 12 min    Assessment/Plan   PT Assessment  PT Assessment Results: Decreased strength, Impaired balance, Decreased mobility, Decreased coordination, Decreased cognition, Pain  Rehab Prognosis: Fair  Barriers to Discharge Home: Cognition needs, Physical needs  Cognition Needs: 24hr supervision for safety awareness needed  Physical Needs: 24hr mobility assistance needed, 24hr ADL assistance needed, High falls risk due to function or environment  Evaluation/Treatment Tolerance: Patient limited by fatigue  Medical Staff Made Aware: Yes  Barriers to Participation: Comorbidities  End of Session Communication: Bedside nurse  Assessment Comment: pt would benefit from skilled therapy services and 24 hour assistance; WIll see pt for TRIAL PT pending progress and participation  End of Session Patient Position: Bed, 3 rail up, Alarm on  IP OR SWING BED PT PLAN  Inpatient or Swing Bed: Inpatient      PT Plan  Treatment/Interventions: Bed mobility, Transfer training, Balance training, Strengthening, Therapeutic exercise  PT Plan: Ongoing PT  PT Frequency: 3 times per week (during this acute inpatient hospitalization; TRIAL PT pending participation and progression)  PT Discharge Recommendations: Moderate intensity level of continued care (· Based on current functional status and rehab potential, patient is anticipated to tolerate and benefit from 5 or more days per week of skilled rehabilitative therapy after discharge from this acute inpatient hospitalization.)  Equipment Recommended upon Discharge: Wheelchair  PT Recommended Transfer Status: Assist x2  PT - OK to Discharge: Yes    Subjective     PT Visit Info:  PT Received On: 06/26/25  General Visit Information:  General  Reason for Referral:  pt is a 84 y/o female admitted with CVA and frequent falls. pt c/o Right sided weakness and current UTI; impaired mobility  Referred By: Dr. Velazquez  Past Medical History Relevant to Rehab: CVA; CAD; HTN; MI; dementia; agitation; UTI; AAA repair; encephalopathy; hyperlipidemia;  Family/Caregiver Present: No  Co-Treatment: OT  Co-Treatment Reason: physically and medically complex patient requiring 2 person assist for safety  Prior to Session Communication: Bedside nurse  Patient Position Received: Bed, 3 rail up, Alarm on  General Comment: RN cleared pt for therapy. pt appeared irritable and frequent closed her eyes. pt needed encouragement to participate.    Home Living:  Home Living  Type of Home: [facility] unit  Lives With: Spouse  Home Adaptive Equipment: Wheelchair-manual  Home Layout: One level  Home Access: Level entry  Bathroom Shower/Tub: Walk-in shower  Bathroom Toilet: Handicapped height  Bathroom Equipment: Grab bars in shower, Built-in shower seat  Home Living Comments: pt is a resident at [facility]      Prior Level of Function:  Prior Function Per Pt/Caregiver Report  Level of Anna: Needs assistance with ADLs, Needs assistance with functional transfers  Receives Help From:  (family and care staff)  ADL Assistance: Needs assistance  Homemaking Assistance: Needs assistance  Ambulatory Assistance: Needs assistance (per chart pt is w/c bound; pt reported performing SPT with Assistance)  Vocational: Retired  Prior Function Comments: pt requires assistance at the Memory Care Unit. pt is a questionable historian      Precautions:  Precautions  Hearing/Visual Limitations: [place]  Medical Precautions: Fall precautions  Precautions Comment: educated and instructed pt on safety techniques during transfers       Vital Signs Comment: pt on room air     Objective   Pain:  Pain Assessment  Pain Assessment:  (FLACC 3/10 LBP during transfers; RN to medicate)  Cognition:  Cognition  Overall Cognitive  Status: Impaired  Orientation Level: Disoriented to place, Disoriented to time, Disoriented to situation  Safety/Judgement:  (impaired safety awareness)  Insight: Moderate    General Assessments:        Activity Tolerance  Endurance:  (FAIR+ activity tolerance)    Sensation  Sensation Comment: denies any numbness/tingling       Coordination  Coordination Comment: difficulty advancing B LE toward EOB    Postural Control  Posture Comment: intermittent posterior trunk lean noted sitting on EOB.    Static Sitting Balance  Static Sitting-Comment/Number of Minutes: FAIR+.  Dynamic Sitting Balance  Dynamic Sitting-Comments: FAIR-/POOR+    Static Standing Balance  Static Standing-Comment/Number of Minutes: N/A      Functional Assessments:  Bed Mobility  Bed Mobility:  (supine to sit via log rolling technique with MAX A x 2 for trunk up, rolling and B LE. pt appeared resistant. intermittent posterior/retolean observed. MOD A for trunk support in midline. pt wanting to lay back down. sit to supine with MAX A x 2 for trunk and B LE. rolled pt toward the Right with MAX A to readjust bedding. Pt remained in sidelying.     Transfers  Transfer:  (attempted sit <-> stand blocking B knee/feet with MAX A x 2/Handheld A x 2. pt began to yell out, 'I don't want to fall!'. reassurance given. pt unable to partial stand. pt very resistance trying to lay back down.)       Extremity/Trunk Assessments:  RUE   RUE :  (limited Right shoulder elevation)  LUE   LUE:  (WFL)  RLE   RLE :  (WFL with grossly 3/5 strength)  LLE   LLE :  (WFL with grossly 3+5 strength)      Outcome Measures:  [facility] Basic Mobility  Turning from your back to your side while in a flat bed without using bedrails: Total  Moving from lying on your back to sitting on the side of a flat bed without using bedrails: Total  Moving to and from bed to chair (including a wheelchair): Total  Standing up from a chair using your arms (e.g. wheelchair or bedside chair): Total  To walk  in hospital room: Total  Climbing 3-5 steps with railing: Total  Basic Mobility - Total Score: 6      Encounter Problems       Encounter Problems (Active)       Mobility       STG - Patient will propel the wheelchair 40' x 1 using B UE strength and CGA (Progressing)       Start:  06/26/25    Expected End:  07/10/25               PT Transfers       STG - Transfer from bed to chair via SPT with MIN A (Progressing)       Start:  06/26/25    Expected End:  07/10/25            STG - Patient to transfer to and from sit to supine via log rolling with MIN A (Progressing)       Start:  06/26/25    Expected End:  07/10/25            STG - Patient will roll side to side with SBA (Progressing)       Start:  06/26/25    Expected End:  07/10/25                   Education Documentation  Precautions, taught by Hood Palomares PT at 6/26/2025 11:05 AM.  Learner: Patient  Readiness: Nonacceptance  Method: Explanation  Response: Verbalizes Understanding, Needs Reinforcement    Body Mechanics, taught by Hood Palomares PT at 6/26/2025 11:05 AM.  Learner: Patient  Readiness: Nonacceptance  Method: Explanation  Response: Verbalizes Understanding, Needs Reinforcement    Mobility Training, taught by Hood Palomares PT at 6/26/2025 11:05 AM.  Learner: Patient  Readiness: Nonacceptance  Method: Explanation  Response: Verbalizes Understanding, Needs Reinforcement    Education Comments  No comments found.

## 2025-06-26 NOTE — CARE PLAN
The patient's goals for the shift include Comfort    The clinical goals for the shift include Safety      Problem: General Stroke  Goal: Establish a mutual long term goal with patient by discharge  Outcome: Progressing  Goal: Demonstrate improvement in neurological exam throughout the shift  Outcome: Progressing  Goal: Maintain BP within ordered limits throughout shift  Outcome: Progressing  Goal: Participate in treatment (ie., meds, therapy) throughout shift  Outcome: Progressing  Goal: No symptoms of aspiration throughout shift  Outcome: Progressing  Goal: No symptoms of hemorrhage throughout shift  Outcome: Progressing  Goal: Tolerate enteral feeding throughout shift  Outcome: Progressing  Goal: Decreased nausea/vomiting throughout shift  Outcome: Progressing  Goal: Controlled blood glucose throughout shift  Outcome: Progressing  Goal: Out of bed three times today  Outcome: Progressing     Problem: ICU Stroke  Goal: Maintain ICP within ordered limits throughout shift  Outcome: Progressing  Goal: Tolerate EVD clamping trial throughout shift  Outcome: Progressing  Goal: Tolerate ventilator weaning trial during shift  Outcome: Progressing  Goal: Maintain patent airway throughout shift  Outcome: Progressing  Goal: Achieve/maintain targeted sodium level throughout shift  Outcome: Progressing     Problem: Pain - Adult  Goal: Verbalizes/displays adequate comfort level or baseline comfort level  Outcome: Progressing     Problem: Safety - Adult  Goal: Free from fall injury  Outcome: Progressing     Problem: Discharge Planning  Goal: Discharge to home or other facility with appropriate resources  Outcome: Progressing     Problem: Chronic Conditions and Co-morbidities  Goal: Patient's chronic conditions and co-morbidity symptoms are monitored and maintained or improved  Outcome: Progressing     Problem: Nutrition  Goal: Nutrient intake appropriate for maintaining nutritional needs  Outcome: Progressing     Problem:  Skin  Goal: Decreased wound size/increased tissue granulation at next dressing change  Outcome: Progressing  Goal: Participates in plan/prevention/treatment measures  Outcome: Progressing  Goal: Prevent/manage excess moisture  Outcome: Progressing  Goal: Prevent/minimize sheer/friction injuries  Outcome: Progressing  Goal: Promote/optimize nutrition  Outcome: Progressing  Goal: Promote skin healing  Outcome: Progressing

## 2025-06-26 NOTE — CONSULTS
"Nutrition Initial Assessment:   Nutrition Assessment    Reason for Assessment: Admission nursing screening    Patient is a 85 y.o. female presenting with right-sided weakness. PMH includes lacunar infarct (December 2024), AAA repair (2017), CAD, MI, HTN, HLD, hypothyroidism, dementia.    Patient was assessed virtually.    Nutrition History:  Energy Intake: Poor < 50 %  Food and Nutrient History: Per nigel, pt is currently disoriented x 3. No PO intakes documented today. Per RN, pt has not eaten well today.       Anthropometrics:  Height: 156.2 cm (5' 1.5\")   Weight: 59.2 kg (130 lb 8.2 oz)   BMI (Calculated): 24.26  IBW/kg (Dietitian Calculated): 48.9 kg  Percent of IBW: 121 %                      Weight History:   Wt Readings from Last 10 Encounters:   06/26/25 59.2 kg (130 lb 8.2 oz)     Weight Change %:  Weight History / % Weight Change: No weight history available to assess    Nutrition Focused Physical Exam Findings:  Defer: remote assessment  Subcutaneous Fat Loss:      Muscle Wasting:     Edema:     Physical Findings:       Nutrition Significant Labs:  BMP Trend:   Results from last 7 days   Lab Units 06/26/25  0554 06/25/25  1348   GLUCOSE mg/dL 91 143*   CALCIUM mg/dL 10.1 10.3   SODIUM mmol/L 138 135*   POTASSIUM mmol/L 4.0 4.2   CO2 mmol/L 27 28   CHLORIDE mmol/L 103 100   BUN mg/dL 20 18   CREATININE mg/dL 0.75 0.87        Nutrition Specific Medications:  amLODIPine, 5 mg, oral, Daily  atorvastatin, 80 mg, oral, Nightly  clopidogrel, 75 mg, oral, Daily  levothyroxine, 100 mcg, oral, Daily  sennosides, 2 tablet, oral, Nightly      I/O:    ;      Dietary Orders (From admission, onward)       Start     Ordered    06/26/25 0029  May Participate in Room Service With Assistance  ( ROOM SERVICE MAY PARTICIPATE WITH ASSISTANCE)  Once        Question:  .  Answer:  Yes    06/26/25 0028 06/25/25 1909  Adult diet Regular  Diet effective now        Question:  Diet type  Answer:  Regular    06/25/25 1908 "                     Estimated Needs:   Total Energy Estimated Needs in 24 hours (kCal): 1776 kCal  Method for Estimating Needs: 30 kcal/kg ABW  Total Protein Estimated Needs in 24 Hours (g): 74 g  Method for Estimating 24 Hour Protein Needs: 1.25 gm/kg ABW, older adult  Total Fluid Estimated Needs in 24 Hours (mL): 1776 mL  Method for Estimating 24 Hour Fluid Needs: 1 mL/kcal  Patient on Order Fluid Restriction: No        Nutrition Diagnosis        Nutrition Diagnosis  Patient has Nutrition Diagnosis: Yes  Diagnosis Status (1): New  Nutrition Diagnosis 1: Inadequate oral intake  Related to (1): dementia  As Evidenced by (1): poor PO intakes today per RN       Nutrition Interventions/Recommendations        Nutrition Recommendations:  Individualized Nutrition Prescription Provided for : Continue regular diet as tolerated. Provide Ensure Plus BID. Consider addition of appetite stimulant.    Nutrition Interventions/Goals:   Goal: Initiate orders for Ensure Plus BID (700 kcal, 40 gm protein total)  Goal: Spoke with RNAnh      Education Documentation  No documentation found.            Nutrition Monitoring and Evaluation   Intake / Amount of food: Consumes at least 50% or more of meals/snacks/supplements  Additional Plans: Please document all % PO intakes for ongoing nutrition assessment                   Goal Status: New goal(s) identified    Time Spent (min): 45 minutes         06/26/25 at 12:42 PM - NEAL GEORGES RDN, ARIEL

## 2025-06-26 NOTE — CONSULTS
Neurology Consult    History Of Present Illness:  Ms. Nails is a 85 y.o. female admitted 6/25/2025 LOS day 1, consulted for stroke rule out.    The history is unclear obtained from the patient.  She has dementia and is a poor historian.  She presents with reported right-sided weakness, after hospitalization for UTI and encephalopathy.  The patient was discharged back to her nursing home.  At which time they thought the right side was weaker than thought that there was left-sided foot drop.  She said to have a recurrent UTI.      Reviewed relevant results, independent review of imaging:   CT head: No acute process, severe atrophy  rCTH: No evolving infarct noted, no acute process  CTA head and neck: Severe focal narrowing at distal left A2, moderate focal narrowing left P2, right P2, 40% narrowing ICA on the right, 30% left  MRI Brain: unable at this time  Labs: Leukocytosis    Past Medical History  dementia, L thalamus stroke in 10/2024, CAD, on dual antiplatelets   Problem List[1]  Medical History[2]  Surgical History  Surgical History[3]  Social History  Social History[4]  Meds and Allergies  Reviewed in EMR  Current Outpatient Medications   Medication Instructions    acetaminophen (TYLENOL) 650 mg, oral, Every 12 hours PRN    amLODIPine (NORVASC) 5 mg, oral, Daily, *Hold if SBP <110*    aspirin 81 mg, oral, Daily    atorvastatin (LIPITOR) 80 mg, oral, Daily    cetirizine (ZYRTEC) 10 mg, oral, Daily    cholecalciferol (VITAMIN D-3) 50 mcg, oral, Daily    clopidogrel (Plavix) 75 mg tablet 1 tablet, oral, Daily    dextromethorphan-guaifenesin  mg/5 mL liquid 10 mL, oral, Every 6 hours PRN    divalproex sprinkle (DEPAKOTE SPRINKLE) 250 mg, oral, 2 times daily    hydrOXYzine HCL (ATARAX) 25 mg, oral, Every 6 hours PRN    levothyroxine (SYNTHROID, LEVOXYL) 100 mcg, oral, Daily    melatonin 10 mg tablet 1 tablet, oral, Nightly PRN    mirtazapine (Remeron) 15 mg tablet 1 tablet, oral, Nightly    multivitamin  "tablet 1 tablet, oral, Daily    QUEtiapine (SEROQUEL) 37.5 mg, oral, 2 times daily    QUEtiapine (SEROQUEL) 25 mg, oral, Every 6 hours PRN    sennosides (Senokot) 8.6 mg tablet 2 tablets, oral, Nightly       Objective:  Blood pressure 110/59, pulse 73, temperature 35.9 °C (96.6 °F), temperature source Temporal, resp. rate 17, height 1.562 m (5' 1.5\"), weight 59.2 kg (130 lb 8.2 oz), SpO2 91%.    Physical Exam:  Neurological Exam:  General: NAD, cooperative   Neuro:  Awake alert, language normal, no dysarthria, very confused poor memory  Visual fields full  EOM full range, alecia 3 mm, no nystagmus   Facial strength normal and symmetric   Tongue midline   Strength moving all 4 extremities antigravity, there is full strength in the bilateral proximal upper extremities.  She does not cooperate with more nuanced examination.  Sensory: normal  Gait: Deferred         Assessment:   Ms. Nails is a 85 y.o. female with history of dementia and recent UTI presenting with possible right sided weakness per her nursing home.  To my examination she does not reveal weakness here, also reports of left foot drop.  Again the patient is very uncooperative with formal examination.  Her CT head shows no acute infarct, however CT angiogram does show multivessel intracranial atherosclerosis.  I repeated a CT of the head to look for evolving infarction, of which this is stable without evidence of evolving infarct.  Ideally would get an MRI scan to clarify the presence or absence of acute stroke, however been informed that she has had issues with MRI in the past, so we will reassess this tomorrow.    Recommendations:  Would continue with dual antiplatelet aspirin and Plavix given significant intracranial atherosclerosis  Continue with high intensity statin  Repeat CT of the head to look for evolving infarct, unlikely  Ideally will get MRI if not troublesome to the patient  Will reassess tomorrow        Medical decision making was high " complexity.  The patient has an acute neurologic disorder with threat to bodily function or life, and I independently interpreted her neuroimaging.    Ronnell Garcia MD          [1]   Patient Active Problem List  Diagnosis    AAA (abdominal aortic aneurysm) without rupture    CAD (coronary artery disease)    Chronic idiopathic constipation    Essential hypertension, benign    Frequent falls    Hypothyroidism, acquired    Cognitive impairment    Mixed hyperlipidemia    S/P AAA repair    Left sided lacunar infarction (Multi)    Routine general medical examination at a health care facility    Cerebrovascular accident (CVA), unspecified mechanism (Multi)    Right sided weakness    Recurrent UTI   [2]   Past Medical History:  Diagnosis Date    CAD (coronary artery disease)     Hypertension     Hypothyroidism     Lacunar infarct, acute (Multi)     Recurrent UTI    [3]   Past Surgical History:  Procedure Laterality Date    ABDOMINAL AORTIC ANEURYSM REPAIR      BACK SURGERY      cervical disc surgery    CARDIAC CATHETERIZATION      with 5 total stents in  and     EYE SURGERY      cataract surgery    JOINT REPLACEMENT      KNEE ARTHROPLASTY Left    [4]   Social History  Tobacco Use    Smoking status: Former     Current packs/day: 0.00     Average packs/day: 1 pack/day for 35.0 years (35.0 ttl pk-yrs)     Types: Cigarettes     Start date:      Quit date:      Years since quittin.5    Smokeless tobacco: Never   Substance Use Topics    Alcohol use: Never    Drug use: Never

## 2025-06-26 NOTE — CONSULTS
Inpatient consult to Infectious Diseases  Consult performed by: Christiano Valdez MD  Consult ordered by: Fiona Velazquez MD            Primary MD: JOI Porras-CNP    Reason For Consult  Leukocytosis    History Of Present Illness  Nasima Nails is a 85 y.o. female presenting with altered mental status.  She has history of dementia, and was reported to have been agitated.  She was also noticed to have right-sided weakness.  She was admitted for further evaluation and management.  I been asked to see patient because of elevated white blood cell count.  She is awake, but unable to provide any history.  CT angio was remarkable for multivessel intracranial atherosclerosis.       Past Medical History  She has a past medical history of CAD (coronary artery disease), Hypertension, Hypothyroidism, Lacunar infarct, acute (Multi) (), and Recurrent UTI.    Surgical History  She has a past surgical history that includes Eye surgery; Cardiac catheterization; Back surgery; Knee Arthroplasty (Left, ); Joint replacement; and Abdominal aortic aneurysm repair.     Social History     Occupational History    Not on file   Tobacco Use    Smoking status: Former     Current packs/day: 0.00     Average packs/day: 1 pack/day for 35.0 years (35.0 ttl pk-yrs)     Types: Cigarettes     Start date:      Quit date:      Years since quittin.5    Smokeless tobacco: Never   Substance and Sexual Activity    Alcohol use: Never    Drug use: Never    Sexual activity: Not on file     Travel History   Travel since 25    No documented travel since 25       Family History  Family History[1]  Allergies  Cephalexin, Ciprofloxacin, Codeine, Erythromycin, Gabapentin, Oxycodone, Tramadol, Minocycline, and Penicillins     Immunization History   Administered Date(s) Administered    DTaP vaccine, pediatric  (INFANRIX) 2011    Flu vaccine (IIV4), preservative free *Check age/dose* 2020    Flu  "vaccine, quadrivalent, high-dose, preservative free, age 65y+ (FLUZONE) 12/27/2022    Flu vaccine, trivalent, preservative free, HIGH-DOSE, age 65y+ (Fluzone) 10/09/2015, 10/24/2016, 09/25/2017, 08/31/2018, 09/25/2019    Influenza Whole 09/16/2009    Influenza, Unspecified 10/20/2010, 10/13/2011, 10/03/2012, 09/28/2013    Influenza, seasonal, injectable 09/24/2014    Moderna SARS-CoV-2 Vaccination 02/01/2021, 03/01/2021, 03/29/2022    Novel influenza-H1N1-09, preservative-free 11/15/2009    PPD Test 09/04/2010    Pneumococcal conjugate vaccine, 13-valent (PREVNAR 13) 10/09/2015    Pneumococcal polysaccharide vaccine, 23-valent, age 2 years and older (PNEUMOVAX 23) 06/07/2016    Tdap vaccine, age 7 year and older (BOOSTRIX, ADACEL) 08/17/2020    Zoster vaccine, recombinant, adult (SHINGRIX) 09/25/2019, 12/09/2019    Zoster, live 07/07/2009     Medications  Home medications:  Prescriptions Prior to Admission[2]  Current medications:  Scheduled medications  Scheduled Medications[3]  Continuous medications  Continuous Medications[4]  PRN medications  PRN Medications[5]    Review of Systems   Unable to perform ROS: Dementia        Objective  Range of Vitals (last 24 hours)  Heart Rate:  []   Temp:  [35.9 °C (96.6 °F)-36.6 °C (97.9 °F)]   Resp:  [11-20]   BP: (105-134)/(59-93)   Height:  [156.2 cm (5' 1.5\")]   Weight:  [58.8 kg (129 lb 10.1 oz)-61.8 kg (136 lb 3.9 oz)]   SpO2:  [89 %-95 %]   Daily Weight  06/26/25 : 59.2 kg (130 lb 8.2 oz)    Body mass index is 24.26 kg/m².     Physical Exam  Constitutional:       General: She is awake.   HENT:      Head: Normocephalic and atraumatic.      Right Ear: External ear normal.      Left Ear: External ear normal.   Eyes:      General: No scleral icterus.     Extraocular Movements: Extraocular movements intact.      Conjunctiva/sclera: Conjunctivae normal.   Cardiovascular:      Rate and Rhythm: Normal rate and regular rhythm.      Heart sounds: Normal heart sounds. " "  Pulmonary:      Effort: Pulmonary effort is normal.      Breath sounds: Normal breath sounds.   Musculoskeletal:      Cervical back: Normal range of motion and neck supple.      Right lower leg: No edema.      Left lower leg: No edema.   Skin:     General: Skin is warm and dry.   Neurological:      Mental Status: She is confused.   Psychiatric:         Behavior: Behavior is cooperative.          Relevant Results  Outside Hospital Results    Labs  Results from last 72 hours   Lab Units 06/26/25  0554 06/25/25  1348   WBC AUTO x10*3/uL 11.5* 16.1*   HEMOGLOBIN g/dL 13.3 12.6   HEMATOCRIT % 39.1 38.3   PLATELETS AUTO x10*3/uL 404 406   NEUTROS PCT AUTO % 67.1 79.2   LYMPHS PCT AUTO % 21.3 12.0   MONOS PCT AUTO % 7.7 6.8   EOS PCT AUTO % 2.4 0.8     Results from last 72 hours   Lab Units 06/26/25  0554 06/25/25  1348   SODIUM mmol/L 138 135*   POTASSIUM mmol/L 4.0 4.2   CHLORIDE mmol/L 103 100   CO2 mmol/L 27 28   BUN mg/dL 20 18   CREATININE mg/dL 0.75 0.87   GLUCOSE mg/dL 91 143*   CALCIUM mg/dL 10.1 10.3   ANION GAP mmol/L 12 11   EGFR mL/min/1.73m*2 78 65     Results from last 72 hours   Lab Units 06/25/25  1348   ALK PHOS U/L 87   BILIRUBIN TOTAL mg/dL 0.6   PROTEIN TOTAL g/dL 7.5   ALT U/L 16   AST U/L 23   ALBUMIN g/dL 3.5     Estimated Creatinine Clearance: 46 mL/min (by C-G formula based on SCr of 0.75 mg/dL).  No results found for: \"CRP\", \"SEDRATE\"  No results found for: \"HIV1X2\", \"HIVCONF\", \"IXZZVP0IS\"  No results found for: \"HEPCABINIT\", \"HEPCAB\", \"HCVPCRQUANT\"  Microbiology  Reviewed-blood cultures pending  Imaging  CT head wo IV contrast  Result Date: 6/26/2025  Interpreted By:  Girish Evans, STUDY: CT HEAD WO IV CONTRAST   INDICATION: Signs/Symptoms:Follow-up to assess for stroke, unable to do MRI   COMPARISON: Head CT 06/25/2025.   ACCESSION NUMBER(S): [id number]   ORDERING CLINICIAN: AGATA LIU   TECHNIQUE: CT of the head was performed without the administration of intravenous contrast.   " FINDINGS: BRAIN PARENCHYMA: No acute intraparenchymal hemorrhage, acute territorial infarct or masses. Moderate microangiopathic disease and diffuse parenchymal volume loss. Chronic infarcts in the left thalamus. Disproportion enlargement of the sylvian fissures compared to the sulci at the vertices.   MIDLINE SHIFT OR HERNIATION: No midline shift or herniation.   VENTRICLES: Moderate ventriculomegaly.   DURAL VENOUS SINUSES: No hyperdensity to suggest acute thrombus.   EXTRA-AXIAL SPACES (epidural, subdural, subarachnoid spaces and basal cisterns): No collections.   VISUALIZED ORBITS: Normal.   VISUALIZED PARANASAL SINUSES AND MASTOID AIR CELLS: Paranasal sinuses are clear. Tympanomastoid cavities are aerated.   SOFT TISSUES: Normal.   OSSEOUS STRUCTURES: Normal.       No acute intracranial abnormality. No evidence of acute territorial infarct.   Findings which may be seen in the setting of adult type hydrocephalus. Correlation with clinical exam recommended.   Moderate microangiopathic disease noted and chronic left thalamic infarct.   Signed by: Girish Evans 6/26/2025 4:18 PM Dictation workstation:   [id number]    ECG 12 lead  Result Date: 6/25/2025  Sinus rhythm with occasional Premature ventricular complexes Otherwise normal ECG No previous ECGs available    CT brain attack angio head and neck W and WO IV contrast  Result Date: 6/25/2025  Interpreted By:  Shaq Cardona, STUDY: CT BRAIN ATTACK ANGIO HEAD AND NECK W AND WO IV CONTRAST;  6/25/2025 1:11 pm   INDICATION: Signs/Symptoms:NIH score 6.     COMPARISON: None.   ACCESSION NUMBER(S): [id number]   ORDERING CLINICIAN: MALINDA MORENO   TECHNIQUE: Unenhanced CT images of the head were obtained. Subsequently, 50 ML of Omnipaque 350 was administered intravenously and axial images of the head and neck were acquired.  Coronal, sagittal, and 3-D reconstructions were provided for review.   FINDINGS: CTA HEAD FINDINGS:   Anterior circulation: There is  atherosclerotic calcification of the cavernous and supraclinoid segment of the bilateral internal carotid artery with mild focal narrowing. There is moderate to severe focal narrowing of the distal A2 segment of the left CHRIS (series 5, image 802). Right CHRIS and bilateral MCAs are patent.   Posterior circulation: There is moderate focal narrowing of the P2 segment of the left PCA (series 5, image 736) and severe focal narrowing of the P2 segment of the right PCA (series 5, image 734). Intradural segment of the right vertebral artery is not visualized, can be hypoplastic or due to chronic occlusion. The basilar artery is patent.   Scratch the CTA NECK FINDINGS:   Right carotid vessels: Atherosclerotic calcification of the right common carotid artery, extending to the right proximal internal carotid artery resulting in narrowing of the lumen measuring 3 mm in compared to distal segment measuring up to 5 mm, compatible with 40% stenosis.   Left carotid vessels: Atherosclerotic calcification of the left common carotid artery, extending to the proximal left internal carotid artery, resulting in narrowing of the lumen measuring 3.8 mm in compared to distal segment measuring up to 5 mm, compatible with 30% stenosis.   Vertebral vessels: Dominant left vertebral artery. The visualized segments of the cervical vertebral arteries are normal in caliber.       1. Moderate to severe focal narrowing of the distal A2 segment of the left CHRIS. 2. Moderate focal narrowing of the P2 segment of the left PCA and severe focal narrowing of the P2 segment of the right PCA. 3. Intradural segment of the right vertebral artery is not visualized, can be hypoplastic or due to chronic occlusion. 4. Approximately 40% right anteriorly% left narrowing of the internal carotid arteries in the neck.   Recommendation: If clinically concerned for acute ischemic infarct, further evaluation with MR of the brain without contrast is recommended.   MACRO:  Critical Finding:  See findings. Notification was initiated on 6/25/2025 at 1:28 pm by  Shaq Cardona.  (**-OCF-**)   Signed by: Shaq Cardona 6/25/2025 1:28 PM Dictation workstation:   [id number]    CT brain attack head wo IV contrast  Result Date: 6/25/2025  Interpreted By:  David Zamora, STUDY: CT BRAIN ATTACK HEAD WO IV CONTRAST; 6/25/2025 1:09 pm   INDICATION: Signs/Symptoms:Stroke Evaluation   COMPARISON: None   ACCESSION NUMBER(S): [id number]   ORDERING CLINICIAN: BRIAN RAMOS   TECHNIQUE: Axial images were obtained through the brain. No IV contrast was administered.   All CT examinations are performed with one or more of the following dose reduction techniques: Automated Exposure Control, adjustment of mA and/or kV according to patient size, or use of iterative reconstruction techniques.   FINDINGS: The ventricles are enlarged but midline in position, with proportional prominence of the sulci, due to atrophy. Patchy periventricular hypodensities are present suggestive of small vessel ischemic white matter disease. Remote lacunar infarct is noted in the left thalamus. There is no intracranial hemorrhage. No mass or mass effect is seen. The osseus structures are unremarkable.       Age related changes and remote left thalamic infarct without acute intracranial process.   The emergency department was notified about the findings by secure message 1320 hours.   Signed by: David Zamora 6/25/2025 1:19 PM Dictation workstation:   [id number]    CT cervical spine wo IV contrast  Result Date: 6/20/2025  * * *Final Report* * * DATE OF EXAM: Jun 20 2025  7:45PM   MYC   [id number]  -  CT CERVICAL SPINE WO IVCON  / ACCESSION #  [id number] PROCEDURE REASON: Spine fracture, cervical, traumatic      * * * * Physician Interpretation * * * *  EXAMINATION: CT BRAIN WO IVCON, CT CERVICAL SPINE WO IVCON CLINICAL HISTORY: Head trauma, moderate to severe. 2 unwitnessed falls today. Hypotension. TECHNIQUE:  Serial axial images  without IV contrast were obtained from the vertex to the foramen magnum.  MQ:  CTBWO_3 CT Radiation dose: Integrated Dose-Length Product (DLP) for this visit =   1670  mGy*cm CT Dose Reduction Employed: Automated exposure control (AEC) COMPARISON: 6/16/2025 RESULT: Post-operative change: None. Acute change: No evidence of an acute infarct or other acute parenchymal process. Hemorrhage: No evidence of acute intracranial hemorrhage. ECASS hemorrhagic transformation score: Not Applicable Mass Lesion / Mass Effect: There is no evidence of an intracranial mass or extraaxial fluid collection.  No significant mass effect. Chronic change: Scattered patchy foci of low attenuation are present within the supratentorial white matter, a nonspecific finding that most commonly represents mild small vessel disease. Atherosclerotic vascular calcifications in the walls of the carotid siphons and intracranial vertebral arteries. Parenchyma: There is moderate generalized volume loss.  The brain parenchyma is otherwise within normal limits for age. Ventricles: Ventricular enlargement concordant with the degree of parenchymal volume loss. Paranasal sinuses and skull base: Paranasal sinus disease appears slightly improved.   The skull base and imaged soft tissues are unremarkable. Localizer images: No additional findings. EXAMINATION:  CT BRAIN WO IVCON, CT CERVICAL SPINE WO IVCON CLINICAL HISTORY:  Neck pain. TECHNIQUE:  Spiral, high resolution axial unenhanced images were obtained from the skull base to the cervicothoracic junction with sagittal and coronal planar reconstructions.  MQ: CTCSPWO_5 CT Radiation dose: Integrated CT Dose-Length Product (DLP) for this visit =  1670 mGy*cm CT Dose Reduction Employed: Automated exposure control (AEC) COMPARISON: 12/9/2024 RESULT: Counting reference:  Craniocervical junction.   Anatomic Variants:  None.  (topogram) images:  No additional findings. Alignment:    Alignment is anatomic.  Craniocervical junction:    Craniocervical junction is normal. Osseous structures/fracture:    No evidence of a lytic or blastic process in the visualized spine.  No evidence of acute or chronic fracture.   Stable appearance of anterior fusion with plate and screw device extending from C4 through C7. Cervical soft tissues:    The paraspinal soft tissues are within normal limits. Degenerative changes: Multilevel degenerative changes throughout the spine similar to previous study.    IMPRESSION: 1.  No evidence of acute intracranial abnormality. 2.  No evidence of acute cervical spine fracture or traumatic malalignment. Anatomic Variant:  None.  Assume 7 cervical vertebrae with counting from the craniocervical junction.  : [facility]   Transcribe Date/Time: Jun 20 2025  8:48P Dictated by : KASHMIR FORBES MD This examination was interpreted and the report reviewed and electronically signed by: KASHMIR FORBES MD on Jun 20 2025  9:01PM  EST    CT head wo IV contrast  Result Date: 6/20/2025  * * *Final Report* * * DATE OF EXAM: Jun 20 2025  7:45PM   MYC   0504  -  CT BRAIN WO IVCON   / ACCESSION #  [id number] PROCEDURE REASON: Head trauma, moderate-severe      * * * * Physician Interpretation * * * *  EXAMINATION: CT BRAIN WO IVCON, CT CERVICAL SPINE WO IVCON CLINICAL HISTORY: Head trauma, moderate to severe. 2 unwitnessed falls today. Hypotension. TECHNIQUE:  Serial axial images without IV contrast were obtained from the vertex to the foramen magnum. MQ:  CTBWO_3 CT Radiation dose: Integrated Dose-Length Product (DLP) for this visit =   1670  mGy*cm CT Dose Reduction Employed: Automated exposure control (AEC) COMPARISON: 6/16/2025 RESULT: Post-operative change: None. Acute change: No evidence of an acute infarct or other acute parenchymal process. Hemorrhage: No evidence of acute intracranial hemorrhage. ECASS hemorrhagic transformation score: Not Applicable Mass Lesion / Mass Effect: There is no evidence of an  intracranial mass or extraaxial fluid collection.  No significant mass effect. Chronic change: Scattered patchy foci of low attenuation are present within the supratentorial white matter, a nonspecific finding that most commonly represents mild small vessel disease. Atherosclerotic vascular calcifications in the walls of the carotid siphons and intracranial vertebral arteries. Parenchyma: There is moderate generalized volume loss.  The brain parenchyma is otherwise within normal limits for age. Ventricles: Ventricular enlargement concordant with the degree of parenchymal volume loss. Paranasal sinuses and skull base: Paranasal sinus disease appears slightly improved.   The skull base and imaged soft tissues are unremarkable. Localizer images: No additional findings. EXAMINATION:  CT BRAIN WO IVCON, CT CERVICAL SPINE WO IVCON CLINICAL HISTORY:  Neck pain. TECHNIQUE:  Spiral, high resolution axial unenhanced images were obtained from the skull base to the cervicothoracic junction with sagittal and coronal planar reconstructions.  MQ: CTCSPWO_5 CT Radiation dose: Integrated CT Dose-Length Product (DLP) for this visit =  1670 mGy*cm CT Dose Reduction Employed: Automated exposure control (AEC) COMPARISON: 12/9/2024 RESULT: Counting reference:  Craniocervical junction.   Anatomic Variants:  None.  (topogram) images:  No additional findings. Alignment:    Alignment is anatomic. Craniocervical junction:    Craniocervical junction is normal. Osseous structures/fracture:    No evidence of a lytic or blastic process in the visualized spine.  No evidence of acute or chronic fracture.   Stable appearance of anterior fusion with plate and screw device extending from C4 through C7. Cervical soft tissues:    The paraspinal soft tissues are within normal limits. Degenerative changes: Multilevel degenerative changes throughout the spine similar to previous study.    IMPRESSION: 1.  No evidence of acute intracranial abnormality.  2.  No evidence of acute cervical spine fracture or traumatic malalignment. Anatomic Variant:  None.  Assume 7 cervical vertebrae with counting from the craniocervical junction.  : [facility]   Transcribe Date/Time: Jun 20 2025  8:48P Dictated by : KASHMIR FORBES MD This examination was interpreted and the report reviewed and electronically signed by: KASHMIR FORBES MD on Jun 20 2025  9:01PM  EST    XR chest 1 view  Result Date: 6/16/2025  * * *Final Report* * * DATE OF EXAM: Jun 16 2025  2:32PM   MYX   5376  -  XR CHEST 1V FRONTAL PORT  / ACCESSION #  [id number] PROCEDURE REASON: Mental status change      * * * * Physician Interpretation * * * *  EXAMINATION:  CHEST RADIOGRAPH (PORTABLE SINGLE VIEW AP) Exam Date/Time:  6/16/2025 2:32 PM CLINICAL HISTORY: Mental status change MQ:  XCPR_5 Comparison:  12/09/2024. RESULT: Lines, tubes, and devices:  ACDF is partially imaged and not well-characterized. Lungs and pleura:  No focal pulmonary opacity.  No pleural effusion or pneumothorax. Cardiomediastinal silhouette:  Stable cardiomediastinal silhouette. Other:  No definite acute osseous abnormality.    IMPRESSION: No acute cardiopulmonary disease. : [facility]   Transcribe Date/Time: Jun 16 2025  3:40P Dictated by : BLAYNE SILVA MD This examination was interpreted and the report reviewed and electronically signed by: BLAYNE SILVA MD on Jun 16 2025  3:43PM  EST    CT head wo IV contrast  Result Date: 6/16/2025  * * *Final Report* * * DATE OF EXAM: Jun 16 2025  2:41PM   MYC   0504  -  CT BRAIN WO IVCON   / ACCESSION #  [id number] PROCEDURE REASON: Mental status change, unknown cause      * * * * Physician Interpretation * * * *  EXAMINATION: CT BRAIN WO IVCON CLINICAL HISTORY: Confusion TECHNIQUE:  Serial axial images without IV contrast were obtained from the vertex to the foramen magnum. MQ:  CTBWO_3 CT Radiation dose: Integrated Dose-Length Product (DLP) for this visit =   866  mGy*cm CT Dose  Reduction Employed: Automated exposure control (AEC) COMPARISON: None. RESULT: Post-operative change: None. Acute change: No evidence of an acute infarct or other acute parenchymal process. Hemorrhage: No evidence of acute intracranial hemorrhage. ECASS hemorrhagic transformation score: Not Applicable Mass Lesion / Mass Effect: There is no evidence of an intracranial mass or extraaxial fluid collection.  No significant mass effect. Chronic change: Scattered patchy foci of low attenuation are present within the supratentorial white matter, a nonspecific finding that most commonly represents mild small vessel disease. Parenchyma: There is mild generalized volume loss.  The brain parenchyma is otherwise within normal limits for age. Ventricles: Ventricular enlargement concordant with the degree of parenchymal volume loss. Paranasal sinuses and skull base: Opacification of the right maxillary sinus, sphenoid sinus and ethmoid air cells.   The skull base and imaged soft tissues are unremarkable. Localizer images: No additional findings.    IMPRESSION: 1.  No evidence of acute intracranial pathology. 2.  Opacification of the ethmoid air cells, sphenoid sinus, and right maxillary sinus. : [facility]   Transcribe Date/Time: Jun 16 2025  3:25P Dictated by : LISA HANLEY MD This examination was interpreted and the report reviewed and electronically signed by: LISA HANLEY MD on Jun 16 2025  3:28PM  EST      Assessment/Plan   Resolving leukocytosis, most likely secondary to stress  Suspected CVA    No antibiotics  Neurology follow-up  Monitor WBC trend  Further workup with leukocytosis persists        Christiano Valdez MD         [1]   Family History  Problem Relation Name Age of Onset    Heart attack Mother  72    Diabetes type II Brother      Heart disease Maternal Grandmother  85    Colon cancer Maternal Grandfather     [2]   Medications Prior to Admission   Medication Sig Dispense Refill Last  Dose/Taking    acetaminophen (Tylenol) 325 mg tablet Take 2 tablets (650 mg) by mouth every 12 hours if needed.   Unknown    amLODIPine (Norvasc) 5 mg tablet Take 1 tablet (5 mg) by mouth once daily. *Hold if SBP <110*   Unknown    aspirin 81 mg EC tablet Take 1 tablet (81 mg) by mouth once daily.   Unknown    atorvastatin (Lipitor) 80 mg tablet Take 1 tablet (80 mg) by mouth once daily.   Unknown    cetirizine (ZyrTEC) 10 mg tablet Take 1 tablet (10 mg) by mouth once daily.   Unknown    cholecalciferol (Vitamin D-3) 50 mcg (2,000 units) capsule Take 1 capsule (50 mcg) by mouth once daily.   Unknown    clopidogrel (Plavix) 75 mg tablet Take 1 tablet (75 mg) by mouth once daily.   Unknown    dextromethorphan-guaifenesin  mg/5 mL liquid Take 10 mL by mouth every 6 hours if needed (cough).   Unknown    divalproex sprinkle (Depakote Sprinkle) 125 mg DR capsule Take 2 capsules (250 mg) by mouth 2 times a day.   Unknown    hydrOXYzine HCL (Atarax) 25 mg tablet Take 1 tablet (25 mg) by mouth every 6 hours if needed for itching or anxiety.   Unknown    levothyroxine (Synthroid, Levoxyl) 100 mcg tablet Take 1 tablet (100 mcg) by mouth once daily.   Unknown    melatonin 10 mg tablet Take 1 tablet (10 mg) by mouth as needed at bedtime (insomnia).   Unknown    mirtazapine (Remeron) 15 mg tablet Take 1 tablet (15 mg) by mouth once daily at bedtime.   Unknown    multivitamin tablet Take 1 tablet by mouth once daily.   Unknown    QUEtiapine (SEROquel) 25 mg tablet Take 1.5 tablets (37.5 mg) by mouth 2 times a day.   Unknown    QUEtiapine (SEROquel) 25 mg tablet Take 1 tablet (25 mg) by mouth every 6 hours if needed (agitation).   Unknown    sennosides (Senokot) 8.6 mg tablet Take 2 tablets (17.2 mg) by mouth once daily at bedtime.   Unknown   [3] amLODIPine, 5 mg, oral, Daily  aspirin, 81 mg, oral, Daily  atorvastatin, 80 mg, oral, Nightly  clopidogrel, 75 mg, oral, Daily  divalproex sprinkle, 250 mg, oral, BID  enoxaparin,  40 mg, subcutaneous, Nightly  levothyroxine, 100 mcg, oral, Daily  mirtazapine, 15 mg, oral, Nightly  sennosides, 2 tablet, oral, Nightly    [4]    [5] PRN medications: acetaminophen, hydrOXYzine HCL, labetaloL, melatonin, oxygen, QUEtiapine

## 2025-06-26 NOTE — PROGRESS NOTES
06/26/25 1525   Discharge Planning   Home or Post Acute Services Post acute facilities (Rehab/SNF/etc)   Type of Post Acute Facility Services Skilled nursing   Expected Discharge Disposition SNF  ([facility])   Does the patient need discharge transport arranged? Yes   RoundTrip coordination needed? Yes     Confirmed with son Artemio via phone that [facility] is FOC.  PT is recommending moderate intensity rehab.      [facility] is able to accept.  Will need a precert closer to discharge.     MRI pending.

## 2025-06-26 NOTE — CARE PLAN
Problem: General Stroke  Goal: Establish a mutual long term goal with patient by discharge  Outcome: Progressing  Goal: Demonstrate improvement in neurological exam throughout the shift  Outcome: Progressing  Goal: Maintain BP within ordered limits throughout shift  Outcome: Progressing  Goal: Participate in treatment (ie., meds, therapy) throughout shift  Outcome: Progressing  Goal: No symptoms of aspiration throughout shift  Outcome: Progressing  Goal: No symptoms of hemorrhage throughout shift  Outcome: Progressing  Goal: Tolerate enteral feeding throughout shift  Outcome: Progressing  Goal: Decreased nausea/vomiting throughout shift  Outcome: Progressing  Goal: Controlled blood glucose throughout shift  Outcome: Progressing  Goal: Out of bed three times today  Outcome: Progressing     Problem: ICU Stroke  Goal: Maintain ICP within ordered limits throughout shift  Outcome: Progressing  Goal: Tolerate EVD clamping trial throughout shift  Outcome: Progressing  Goal: Tolerate ventilator weaning trial during shift  Outcome: Progressing  Goal: Maintain patent airway throughout shift  Outcome: Progressing  Goal: Achieve/maintain targeted sodium level throughout shift  Outcome: Progressing     Problem: Pain - Adult  Goal: Verbalizes/displays adequate comfort level or baseline comfort level  Outcome: Progressing     Problem: Safety - Adult  Goal: Free from fall injury  Outcome: Progressing     Problem: Discharge Planning  Goal: Discharge to home or other facility with appropriate resources  Outcome: Progressing     Problem: Chronic Conditions and Co-morbidities  Goal: Patient's chronic conditions and co-morbidity symptoms are monitored and maintained or improved  Outcome: Progressing     Problem: Nutrition  Goal: Nutrient intake appropriate for maintaining nutritional needs  Outcome: Progressing     Problem: Skin  Goal: Decreased wound size/increased tissue granulation at next dressing change  Outcome:  Progressing  Flowsheets (Taken 6/26/2025 1040)  Decreased wound size/increased tissue granulation at next dressing change: Protective dressings over bony prominences  Goal: Participates in plan/prevention/treatment measures  Outcome: Progressing  Flowsheets (Taken 6/26/2025 1040)  Participates in plan/prevention/treatment measures: Elevate heels  Goal: Prevent/manage excess moisture  Outcome: Progressing  Flowsheets (Taken 6/26/2025 1040)  Prevent/manage excess moisture: Moisturize dry skin  Goal: Prevent/minimize sheer/friction injuries  Outcome: Progressing  Flowsheets (Taken 6/26/2025 1040)  Prevent/minimize sheer/friction injuries: Use pull sheet  Goal: Promote/optimize nutrition  Outcome: Progressing  Flowsheets (Taken 6/26/2025 1040)  Promote/optimize nutrition: Offer water/supplements/favorite foods  Goal: Promote skin healing  Outcome: Progressing  Flowsheets (Taken 6/26/2025 1040)  Promote skin healing: Turn/reposition every 2 hours/use positioning/transfer devices   The patient's goals for the shift include Comfort    The clinical goals for the shift include NO FALLS    Over the shift, the patient did not make progress toward the following goals. Barriers to progression include CO-MORBIDITIES. Recommendations to address these barriers include PT/OT/ASSIST WITH AMBULATION as necessary.

## 2025-06-26 NOTE — PROGRESS NOTES
Occupational Therapy    Evaluation    Patient Name: Nasima Nails  MRN: [id number]  Department: [address]  Room: 13/13-  Today's Date: 6/26/2025  Time Calculation  Start Time: 0850  Stop Time: 0901  Time Calculation (min): 11 min        Assessment:  OT Assessment: pt presents with increased right sided weakness, confusion, impaired coordination/sequencing, reduced endurance and decreased balance which impedes ADL performance. pt would benefit from skilled OT services to address these deficits and to facilitate highest level of independence.  Prognosis: Poor  Barriers to Discharge Home: Cognition needs, Physical needs  Cognition Needs: 24hr supervision for safety awareness needed  Physical Needs: 24hr mobility assistance needed, 24hr ADL assistance needed  Evaluation/Treatment Tolerance: Treatment limited secondary to agitation  Medical Staff Made Aware: Yes  End of Session Communication: Bedside nurse  End of Session Patient Position: Bed, 3 rail up, Alarm on  OT Assessment Results: Decreased ADL status, Decreased upper extremity range of motion, Decreased upper extremity strength, Decreased safe judgment during ADL, Decreased cognition, Decreased endurance, Decreased sensation, Decreased fine motor control, Decreased gross motor control, Decreased functional mobility, Decreased trunk control for functional activities  Prognosis: Poor  Evaluation/Treatment Tolerance: Treatment limited secondary to agitation  Medical Staff Made Aware: Yes  Barriers to Participation: Ability to acquire knowledge, Attitude of self, Comorbidities  Plan:  Treatment Interventions: ADL retraining, Functional transfer training, UE strengthening/ROM, Endurance training, Cognitive reorientation, Equipment evaluation/education, Neuromuscular reeducation, Fine motor coordination activities, Compensatory technique education  OT Frequency: 3 times per week  OT Discharge Recommendations: Moderate intensity level of continued care  Equipment  Recommended upon Discharge: Wheelchair  OT Recommended Transfer Status: Assist of 2, Maximum assist  OT - OK to Discharge: Yes  Treatment Interventions: ADL retraining, Functional transfer training, UE strengthening/ROM, Endurance training, Cognitive reorientation, Equipment evaluation/education, Neuromuscular reeducation, Fine motor coordination activities, Compensatory technique education    Subjective     OT Visit Info:  OT Received On: 06/26/25  General:  General  Reason for Referral: ADL impairment; 85 yr old female presenting with right sided weakness.  CT scan: Moderate to severe focal narrowing of the distal A2 segment of the left CHRIS.  Past Medical History Relevant to Rehab: PMHX lacunar infarct Dec 2024, AAA repair 2017, CAD, MI, hypertension, hyperlipidemia, hypothyroid, dementia, history of delirium and agitation with behavioral outburst/aggression, frequent UTIs, frequent falls  Family/Caregiver Present: No  Co-Treatment: PT  Co-Treatment Reason: to maximize safety and participation  Prior to Session Communication: Bedside nurse  Patient Position Received: Bed, 3 rail up, Alarm on  Preferred Learning Style: verbal, visual  General Comment: pt significantly confused, hard to wake and agitated at times.  pt with poor tolerance to activity and wanting to lay back down  Precautions:  Medical Precautions: Fall precautions     Date/Time Vitals Session Patient Position Pulse Resp SpO2 BP MAP (mmHg)    06/26/25 0850 During OT  --  73  --  --  --  --                 Pain:  Pain Assessment  Pain Assessment: 0-10  0-10 (Numeric) Pain Score: 2  Pain Type: Chronic pain  Pain Location: Back  Pain Orientation: Lower    Objective   Cognition:  Overall Cognitive Status: Impaired at baseline  Arousal/Alertness: Generalized responses  Orientation Level: Disoriented to place, Disoriented to time, Disoriented to situation  Following Commands: Follows one step commands with repetition  Cognition Comments: agitated,  self-limiting behavior, flat affect, poor historian  Processing Speed: Delayed           Home Living:  Type of Home: Memory Care unit ([facility])  Lives With: Spouse  Home Adaptive Equipment: Wheelchair-manual  Home Living Comments: lives in memory care unit with   Prior Function:  Level of [place]: Needs assistance with ADLs, Needs assistance with functional transfers  Receives Help From:  (memory care staff)  ADL Assistance: Needs assistance  Homemaking Assistance: Needs assistance  Ambulatory Assistance: Needs assistance  Prior Function Comments: uses wheelchair at baseline, requires assistance for transfers and ADLs due to cognitive deficits     ADL:  Eating Assistance: Minimal (anticipated)  Grooming Assistance: Moderate (anticipated)  Bathing Assistance: Maximal (anticipated)  UE Dressing Assistance: Maximal (anticipated)  LE Dressing Assistance: Total (anticipated)  Toileting Assistance with Device: Total (anticipated)  ADL Comments: poor ADL participation due to agitation  Activity Tolerance:  Endurance: Decreased tolerance for upright activites  Bed Mobility/Transfers: Bed Mobility  Bed Mobility: Yes  Bed Mobility 1  Bed Mobility 1: Supine to sitting  Level of Assistance 1: Maximum assistance, +2  Bed Mobility Comments 1: assist x2 for managing BLE to edge of bed and for lifting trunk.  pt with poor efforts due to cognitive deficits  Bed Mobility 2  Bed Mobility  2: Sitting to supine  Level of Assistance 2: Maximum assistance  Bed Mobility Comments 2: MAX A for lifting BLE back into bed; effortful  Bed Mobility 3  Bed Mobility 3: Rolling right  Level of Assistance 3: Maximum assistance  Bed Mobility Comments 3: MAX A for rolling with use of bed rail to adjust bedding/chux    Transfers  Transfer: Yes  Transfer 1  Technique 1: Sit to stand  Trials/Comments 1: attempted sit<>stand with use of walker however pt with poor efforts and with increased agitation.  unable to achieve standing position  despite max assist from therapists    Sitting Balance:  Static Sitting Balance  Static Sitting-Balance Support: Feet supported  Static Sitting-Level of Assistance: Moderate assistance  Static Sitting-Comment/Number of Minutes: MOD A for sitting EOB due to retropulsion.  pt with decreased tolerance for sitting due to back pain and agitation      Vision:Vision - Basic Assessment  Current Vision: Wears glasses all the time  Sensation:  Light Touch: Partial deficits in the RUE  Strength:  Strength Comments: Right sided weakness/ataxia present; 3-/5.  LUE WFL  Perception:  Initiation: Cues to initiate tasks  Coordination:  Movements are Fluid and Coordinated: No  Upper Body Coordination: Decreased coordination/ataxia RUE, delayed motor planning   Hand Function:  Gross Grasp: Functional  Coordination:  (R hand impaired)  Extremities: RUE   RUE :  (Shoulder flexion ~90 degrees, 3- strength distally, ataxia present) and LUE   LUE: Within Functional Limits    Outcome Measures:[facility] Daily Activity  Putting on and taking off regular lower body clothing: Total  Bathing (including washing, rinsing, drying): A lot  Putting on and taking off regular upper body clothing: A lot  Toileting, which includes using toilet, bedpan or urinal: Total  Taking care of personal grooming such as brushing teeth: A lot  Eating Meals: A little  Daily Activity - Total Score: 11        Education Documentation  Body Mechanics, taught by Jesse Ram OT at 6/26/2025 10:46 AM.  Learner: Patient  Readiness: Acceptance  Method: Explanation, Demonstration  Response: No Evidence of Learning  Comment: ADL/functional mobility techniques, facilitating OOB activity, OT POC    ADL Training, taught by Jesse Ram OT at 6/26/2025 10:46 AM.  Learner: Patient  Readiness: Acceptance  Method: Explanation, Demonstration  Response: No Evidence of Learning  Comment: ADL/functional mobility techniques, facilitating OOB activity, OT POC    Education Comments  No  comments found.        OP EDUCATION:       Goals:  Encounter Problems       Encounter Problems (Active)       ADLs       Patient will feed self with supervision level of assistance and verbal cues using PRN adaptive equipment. (Progressing)       Start:  06/26/25    Expected End:  07/26/25            Patient will complete daily grooming tasks with minimal assist  level of assistance and PRN adaptive equipment while supported sitting. (Progressing)       Start:  06/26/25    Expected End:  07/26/25            Patient will complete toileting including hygiene clothing management/hygiene with moderate assist level of assistance and bedside commode. (Progressing)       Start:  06/26/25    Expected End:  07/26/25               TRANSFERS       Patient will perform bed mobility minimal assist  level of assistance and bed rails in order to improve safety and independence with mobility (Progressing)       Start:  06/26/25    Expected End:  07/26/25            Patient will complete functional transfer to BSC/toilet with least restrictive device with moderate assist level of assistance. (Progressing)       Start:  06/26/25    Expected End:  07/26/25

## 2025-06-26 NOTE — NURSING NOTE
Four eyes skin check completed with Keri NIXON RN and Anh WARREN RN.    No acute findings noted; scattered bruising, and redness that is blanchable to buttocks and coccyx.

## 2025-06-27 ENCOUNTER — APPOINTMENT (OUTPATIENT)
Dept: CARDIOLOGY | Facility: HOSPITAL | Age: 85
End: 2025-06-27
Payer: MEDICARE

## 2025-06-27 LAB
ALBUMIN SERPL BCP-MCNC: 3.4 G/DL (ref 3.4–5)
ALP SERPL-CCNC: 81 U/L (ref 33–136)
ALT SERPL W P-5'-P-CCNC: 10 U/L (ref 7–45)
ANION GAP SERPL CALCULATED.3IONS-SCNC: 10 MMOL/L (ref 10–20)
AST SERPL W P-5'-P-CCNC: 18 U/L (ref 9–39)
BASOPHILS # BLD AUTO: 0.07 X10*3/UL (ref 0–0.1)
BASOPHILS NFR BLD AUTO: 0.5 %
BILIRUB SERPL-MCNC: 0.4 MG/DL (ref 0–1.2)
BUN SERPL-MCNC: 24 MG/DL (ref 6–23)
CALCIUM SERPL-MCNC: 10.1 MG/DL (ref 8.6–10.3)
CHLORIDE SERPL-SCNC: 104 MMOL/L (ref 98–107)
CO2 SERPL-SCNC: 28 MMOL/L (ref 21–32)
CREAT SERPL-MCNC: 0.73 MG/DL (ref 0.5–1.05)
EGFRCR SERPLBLD CKD-EPI 2021: 81 ML/MIN/1.73M*2
EOSINOPHIL # BLD AUTO: 0.24 X10*3/UL (ref 0–0.4)
EOSINOPHIL NFR BLD AUTO: 1.9 %
ERYTHROCYTE [DISTWIDTH] IN BLOOD BY AUTOMATED COUNT: 15.8 % (ref 11.5–14.5)
GLUCOSE BLD MANUAL STRIP-MCNC: 100 MG/DL (ref 74–99)
GLUCOSE BLD MANUAL STRIP-MCNC: 90 MG/DL (ref 74–99)
GLUCOSE BLD MANUAL STRIP-MCNC: 96 MG/DL (ref 74–99)
GLUCOSE SERPL-MCNC: 86 MG/DL (ref 74–99)
HCT VFR BLD AUTO: 39.1 % (ref 36–46)
HGB BLD-MCNC: 12.5 G/DL (ref 12–16)
IMM GRANULOCYTES # BLD AUTO: 0.09 X10*3/UL (ref 0–0.5)
IMM GRANULOCYTES NFR BLD AUTO: 0.7 % (ref 0–0.9)
LYMPHOCYTES # BLD AUTO: 3.2 X10*3/UL (ref 0.8–3)
LYMPHOCYTES NFR BLD AUTO: 25.1 %
MCH RBC QN AUTO: 28.8 PG (ref 26–34)
MCHC RBC AUTO-ENTMCNC: 32 G/DL (ref 32–36)
MCV RBC AUTO: 90 FL (ref 80–100)
MONOCYTES # BLD AUTO: 1.28 X10*3/UL (ref 0.05–0.8)
MONOCYTES NFR BLD AUTO: 10 %
NEUTROPHILS # BLD AUTO: 7.88 X10*3/UL (ref 1.6–5.5)
NEUTROPHILS NFR BLD AUTO: 61.8 %
NRBC BLD-RTO: 0 /100 WBCS (ref 0–0)
PLATELET # BLD AUTO: 425 X10*3/UL (ref 150–450)
POTASSIUM SERPL-SCNC: 4.3 MMOL/L (ref 3.5–5.3)
PROT SERPL-MCNC: 7.3 G/DL (ref 6.4–8.2)
RBC # BLD AUTO: 4.34 X10*6/UL (ref 4–5.2)
SODIUM SERPL-SCNC: 138 MMOL/L (ref 136–145)
WBC # BLD AUTO: 12.8 X10*3/UL (ref 4.4–11.3)

## 2025-06-27 PROCEDURE — 2500000004 HC RX 250 GENERAL PHARMACY W/ HCPCS (ALT 636 FOR OP/ED): Performed by: NURSE PRACTITIONER

## 2025-06-27 PROCEDURE — 2500000001 HC RX 250 WO HCPCS SELF ADMINISTERED DRUGS (ALT 637 FOR MEDICARE OP): Performed by: NURSE PRACTITIONER

## 2025-06-27 PROCEDURE — 99233 SBSQ HOSP IP/OBS HIGH 50: CPT | Performed by: STUDENT IN AN ORGANIZED HEALTH CARE EDUCATION/TRAINING PROGRAM

## 2025-06-27 PROCEDURE — 85025 COMPLETE CBC W/AUTO DIFF WBC: CPT | Performed by: INTERNAL MEDICINE

## 2025-06-27 PROCEDURE — 2060000001 HC INTERMEDIATE ICU ROOM DAILY

## 2025-06-27 PROCEDURE — 36415 COLL VENOUS BLD VENIPUNCTURE: CPT | Performed by: INTERNAL MEDICINE

## 2025-06-27 PROCEDURE — 82947 ASSAY GLUCOSE BLOOD QUANT: CPT

## 2025-06-27 PROCEDURE — 93005 ELECTROCARDIOGRAM TRACING: CPT

## 2025-06-27 PROCEDURE — 97530 THERAPEUTIC ACTIVITIES: CPT | Mod: GO,CO

## 2025-06-27 PROCEDURE — 97530 THERAPEUTIC ACTIVITIES: CPT | Mod: GP,CQ

## 2025-06-27 PROCEDURE — 84075 ASSAY ALKALINE PHOSPHATASE: CPT | Performed by: INTERNAL MEDICINE

## 2025-06-27 PROCEDURE — 2500000002 HC RX 250 W HCPCS SELF ADMINISTERED DRUGS (ALT 637 FOR MEDICARE OP, ALT 636 FOR OP/ED): Performed by: NURSE PRACTITIONER

## 2025-06-27 RX ADMIN — CLOPIDOGREL 75 MG: 75 TABLET ORAL at 09:13

## 2025-06-27 RX ADMIN — STANDARDIZED SENNA CONCENTRATE 17.2 MG: 8.6 TABLET ORAL at 20:33

## 2025-06-27 RX ADMIN — LEVOTHYROXINE SODIUM 100 MCG: 0.1 TABLET ORAL at 05:59

## 2025-06-27 RX ADMIN — DIVALPROEX SODIUM 250 MG: 125 CAPSULE, COATED PELLETS ORAL at 20:34

## 2025-06-27 RX ADMIN — DIVALPROEX SODIUM 250 MG: 125 CAPSULE, COATED PELLETS ORAL at 09:13

## 2025-06-27 RX ADMIN — MIRTAZAPINE 15 MG: 15 TABLET, FILM COATED ORAL at 20:42

## 2025-06-27 RX ADMIN — ASPIRIN 81 MG: 81 TABLET, COATED ORAL at 09:13

## 2025-06-27 RX ADMIN — ATORVASTATIN CALCIUM 80 MG: 80 TABLET, FILM COATED ORAL at 20:33

## 2025-06-27 RX ADMIN — AMLODIPINE BESYLATE 5 MG: 5 TABLET ORAL at 09:13

## 2025-06-27 RX ADMIN — ENOXAPARIN SODIUM 40 MG: 100 INJECTION SUBCUTANEOUS at 20:33

## 2025-06-27 ASSESSMENT — COGNITIVE AND FUNCTIONAL STATUS - GENERAL
TOILETING: A LOT
MOVING TO AND FROM BED TO CHAIR: A LITTLE
MOVING FROM LYING ON BACK TO SITTING ON SIDE OF FLAT BED WITH BEDRAILS: TOTAL
DRESSING REGULAR LOWER BODY CLOTHING: TOTAL
MOBILITY SCORE: 15
STANDING UP FROM CHAIR USING ARMS: TOTAL
TURNING FROM BACK TO SIDE WHILE IN FLAT BAD: TOTAL
WALKING IN HOSPITAL ROOM: A LOT
MOVING TO AND FROM BED TO CHAIR: TOTAL
DRESSING REGULAR UPPER BODY CLOTHING: A LOT
CLIMB 3 TO 5 STEPS WITH RAILING: A LOT
TURNING FROM BACK TO SIDE WHILE IN FLAT BAD: A LITTLE
WALKING IN HOSPITAL ROOM: TOTAL
DRESSING REGULAR UPPER BODY CLOTHING: A LOT
WALKING IN HOSPITAL ROOM: A LOT
DAILY ACTIVITIY SCORE: 11
DRESSING REGULAR LOWER BODY CLOTHING: A LOT
DAILY ACTIVITIY SCORE: 13
MOBILITY SCORE: 6
TOILETING: TOTAL
EATING MEALS: A LITTLE
CLIMB 3 TO 5 STEPS WITH RAILING: A LOT
TURNING FROM BACK TO SIDE WHILE IN FLAT BAD: A LITTLE
TOILETING: A LOT
MOVING FROM LYING ON BACK TO SITTING ON SIDE OF FLAT BED WITH BEDRAILS: A LITTLE
PERSONAL GROOMING: A LOT
EATING MEALS: A LITTLE
STANDING UP FROM CHAIR USING ARMS: A LOT
EATING MEALS: A LITTLE
PERSONAL GROOMING: A LOT
CLIMB 3 TO 5 STEPS WITH RAILING: TOTAL
DRESSING REGULAR UPPER BODY CLOTHING: A LOT
MOBILITY SCORE: 16
HELP NEEDED FOR BATHING: A LOT
HELP NEEDED FOR BATHING: A LOT
DAILY ACTIVITIY SCORE: 13
STANDING UP FROM CHAIR USING ARMS: A LOT
PERSONAL GROOMING: A LOT
MOVING TO AND FROM BED TO CHAIR: A LITTLE
DRESSING REGULAR LOWER BODY CLOTHING: A LOT
HELP NEEDED FOR BATHING: A LOT

## 2025-06-27 ASSESSMENT — PAIN SCALES - GENERAL
PAINLEVEL_OUTOF10: 0 - NO PAIN

## 2025-06-27 ASSESSMENT — PAIN - FUNCTIONAL ASSESSMENT
PAIN_FUNCTIONAL_ASSESSMENT: 0-10

## 2025-06-27 NOTE — NURSING NOTE
This nurse notified by [facility] tech that patient is scheduled for MRI tonight. Per patient history, has had issues with MRI in the past and needs to be medicated but cannot get ativan as patient had opposite reaction in the past. Neuro aware and is to reassess tomorrow. MRI tech made aware.

## 2025-06-27 NOTE — PROGRESS NOTES
"Neurology Follow Up    Subjective:  Ms. Nails is a 85 y.o. female admitted 6/25/2025, LOS 2. Neurology is consulted for stroke.     Condition unchanged.    Objective:  Blood pressure (!) 116/46, pulse 87, temperature 36.5 °C (97.7 °F), temperature source Temporal, resp. rate 17, height 1.562 m (5' 1.5\"), weight 61.4 kg (135 lb 5.8 oz), SpO2 93%.    Physical Exam:  Neurological Exam:  General: NAD, cooperative   Neuro:  Awake alert, language normal, no dysarthria, very confused poor memory  Visual fields full  EOM full range, alecia 3 mm, no nystagmus   Facial strength normal and symmetric   Tongue midline   Strength moving all 4 extremities antigravity, slight proximal greater than distal right upper extremity weakness which is limited by pain in the shoulder.  Left arm is normal strength.  Right lower extremities normal strength.  Left lower extremity is normal strength proximally, however there is foot drop with preserved plantarflexion, due to cooperation unable to test other muscle groups.  Sensory: normal  Gait: Deferred      Reviewed relevant results, independent review/interpretation of imaging:   CT head: No acute process, severe atrophy  rCTH: No evolving infarct noted, no acute process  CTA head and neck: Severe focal narrowing at distal left A2, moderate focal narrowing left P2, right P2, 40% narrowing ICA on the right, 30% left  MRI Brain: unable at this time  Labs: Leukocytosis      Assessment:   Ms. Nails is a 85 y.o. female with history of dementia, prior left thalamic infarct 2024, and recent UTI presenting with possible right sided weakness per her nursing home.  The right arm may be weak, but it is pain limited which may contribute to the intermittent nature of this, however may be recrudescent weakness from her prior left thalamic infarct.  In the left lower extremity there is a foot drop which I was able to better assess today however again the patient is very uncooperative with formal " examination.  Her CT head shows no acute infarct, however CT angiogram does show multivessel intracranial atherosclerosis.  I repeated a CT of the head to look for evolving infarction, of which this is stable without evidence of evolving infarct.  Ideally would get an MRI scan to clarify the presence or absence of acute stroke, however been informed that she has had issues with MRI in the past, so we will reassess this tomorrow.  Given the above information I cannot exclude a small stroke resulting in right arm weakness, however felt less likely or relevant to her overall medical trajectory.  She also has a left foot drop which appears peripheral in origin, and may be from a pinched nerve at the femoral neck or lumbar spine disease.  It would be challenging to further evaluate this with neurodiagnostics, and would recommend monitoring the for improvement and use of an AFO for foot support.     Recommendations:  Would continue with dual antiplatelet aspirin and Plavix given significant intracranial atherosclerosis  Continue with high intensity statin  PT OT  She is not walking in wheelchair, if able to ambulate, can use an AFO for left foot drop.   I discussed the above plan in detail with the son  Neurology will sign off      Medical decision making was high complexity.  The patient has an acute neurologic disorder with threat to bodily function or life, and I independently interpreted her neuroimaging.    Ronnell Garcia MD

## 2025-06-27 NOTE — PROGRESS NOTES
Nasima Nails is a 85 y.o. female on day 2 of admission presenting with Cerebrovascular accident (CVA), unspecified mechanism (Multi).    Subjective   Interval History:   Patient seen and examined  Awake, having dinner  Afebrile        Review of Systems   Unable to perform ROS: Dementia       Objective   Range of Vitals (last 24 hours)  Heart Rate:  [77-99]   Temp:  [36.3 °C (97.3 °F)-37 °C (98.6 °F)]   Resp:  [16-18]   BP: (113-132)/(46-78)   Weight:  [61.4 kg (135 lb 5.8 oz)]   SpO2:  [90 %-95 %]   Daily Weight  06/27/25 : 61.4 kg (135 lb 5.8 oz)    Body mass index is 25.16 kg/m².    Physical Exam  Constitutional:       General: She is awake.   HENT:      Head: Normocephalic and atraumatic.      Right Ear: External ear normal.      Left Ear: External ear normal.   Eyes:      General: No scleral icterus.     Extraocular Movements: Extraocular movements intact.      Conjunctiva/sclera: Conjunctivae normal.   Cardiovascular:      Rate and Rhythm: Normal rate and regular rhythm.      Heart sounds: Normal heart sounds.   Pulmonary:      Effort: Pulmonary effort is normal.      Breath sounds: Normal breath sounds.   Musculoskeletal:      Cervical back: Normal range of motion and neck supple.      Right lower leg: No edema.      Left lower leg: No edema.   Skin:     General: Skin is warm and dry.   Neurological:      Mental Status: She is confused.   Psychiatric:         Behavior: Behavior is cooperative.     Antibiotics  This patient does not have an active medication from one of the medication groupers.    Relevant Results  Labs  Results from last 72 hours   Lab Units 06/26/25  0554 06/25/25  1348   WBC AUTO x10*3/uL 11.5* 16.1*   HEMOGLOBIN g/dL 13.3 12.6   HEMATOCRIT % 39.1 38.3   PLATELETS AUTO x10*3/uL 404 406   NEUTROS PCT AUTO % 67.1 79.2   LYMPHS PCT AUTO % 21.3 12.0   MONOS PCT AUTO % 7.7 6.8   EOS PCT AUTO % 2.4 0.8     Results from last 72 hours   Lab Units 06/26/25  0554 06/25/25  1348   SODIUM mmol/L  "138 135*   POTASSIUM mmol/L 4.0 4.2   CHLORIDE mmol/L 103 100   CO2 mmol/L 27 28   BUN mg/dL 20 18   CREATININE mg/dL 0.75 0.87   GLUCOSE mg/dL 91 143*   CALCIUM mg/dL 10.1 10.3   ANION GAP mmol/L 12 11   EGFR mL/min/1.73m*2 78 65     Results from last 72 hours   Lab Units 06/25/25  1348   ALK PHOS U/L 87   BILIRUBIN TOTAL mg/dL 0.6   PROTEIN TOTAL g/dL 7.5   ALT U/L 16   AST U/L 23   ALBUMIN g/dL 3.5     Estimated Creatinine Clearance: 46.8 mL/min (by C-G formula based on SCr of 0.75 mg/dL).  No results found for: \"CRP\"  Microbiology  Reviewed-blood cultures pending  Imaging  ECG 12 lead  Result Date: 6/26/2025  Sinus rhythm with occasional Premature ventricular complexes Nonspecific ST-T changes No previous ECGs available Confirmed by Micheal Mendez ([id number]) on 6/26/2025 9:27:40 PM    CT head wo IV contrast  Result Date: 6/26/2025  Interpreted By:  Girish Evans, STUDY: CT HEAD WO IV CONTRAST   INDICATION: Signs/Symptoms:Follow-up to assess for stroke, unable to do MRI   COMPARISON: Head CT 06/25/2025.   ACCESSION NUMBER(S): [id number]   ORDERING CLINICIAN: AGATA LIU   TECHNIQUE: CT of the head was performed without the administration of intravenous contrast.   FINDINGS: BRAIN PARENCHYMA: No acute intraparenchymal hemorrhage, acute territorial infarct or masses. Moderate microangiopathic disease and diffuse parenchymal volume loss. Chronic infarcts in the left thalamus. Disproportion enlargement of the sylvian fissures compared to the sulci at the vertices.   MIDLINE SHIFT OR HERNIATION: No midline shift or herniation.   VENTRICLES: Moderate ventriculomegaly.   DURAL VENOUS SINUSES: No hyperdensity to suggest acute thrombus.   EXTRA-AXIAL SPACES (epidural, subdural, subarachnoid spaces and basal cisterns): No collections.   VISUALIZED ORBITS: Normal.   VISUALIZED PARANASAL SINUSES AND MASTOID AIR CELLS: Paranasal sinuses are clear. Tympanomastoid cavities are aerated.   SOFT TISSUES: Normal.   OSSEOUS " STRUCTURES: Normal.       No acute intracranial abnormality. No evidence of acute territorial infarct.   Findings which may be seen in the setting of adult type hydrocephalus. Correlation with clinical exam recommended.   Moderate microangiopathic disease noted and chronic left thalamic infarct.   Signed by: Girish Evans 6/26/2025 4:18 PM Dictation workstation:   [id number]    CT brain attack angio head and neck W and WO IV contrast  Result Date: 6/25/2025  Interpreted By:  Shaq Cardona, STUDY: CT BRAIN ATTACK ANGIO HEAD AND NECK W AND WO IV CONTRAST;  6/25/2025 1:11 pm   INDICATION: Signs/Symptoms:NIH score 6.     COMPARISON: None.   ACCESSION NUMBER(S): [id number]   ORDERING CLINICIAN: MALINDA MORENO   TECHNIQUE: Unenhanced CT images of the head were obtained. Subsequently, 50 ML of Omnipaque 350 was administered intravenously and axial images of the head and neck were acquired.  Coronal, sagittal, and 3-D reconstructions were provided for review.   FINDINGS: CTA HEAD FINDINGS:   Anterior circulation: There is atherosclerotic calcification of the cavernous and supraclinoid segment of the bilateral internal carotid artery with mild focal narrowing. There is moderate to severe focal narrowing of the distal A2 segment of the left CHRIS (series 5, image 802). Right CHRIS and bilateral MCAs are patent.   Posterior circulation: There is moderate focal narrowing of the P2 segment of the left PCA (series 5, image 736) and severe focal narrowing of the P2 segment of the right PCA (series 5, image 734). Intradural segment of the right vertebral artery is not visualized, can be hypoplastic or due to chronic occlusion. The basilar artery is patent.   Scratch the CTA NECK FINDINGS:   Right carotid vessels: Atherosclerotic calcification of the right common carotid artery, extending to the right proximal internal carotid artery resulting in narrowing of the lumen measuring 3 mm in compared to distal segment measuring up to 5 mm,  compatible with 40% stenosis.   Left carotid vessels: Atherosclerotic calcification of the left common carotid artery, extending to the proximal left internal carotid artery, resulting in narrowing of the lumen measuring 3.8 mm in compared to distal segment measuring up to 5 mm, compatible with 30% stenosis.   Vertebral vessels: Dominant left vertebral artery. The visualized segments of the cervical vertebral arteries are normal in caliber.       1. Moderate to severe focal narrowing of the distal A2 segment of the left CHRIS. 2. Moderate focal narrowing of the P2 segment of the left PCA and severe focal narrowing of the P2 segment of the right PCA. 3. Intradural segment of the right vertebral artery is not visualized, can be hypoplastic or due to chronic occlusion. 4. Approximately 40% right anteriorly% left narrowing of the internal carotid arteries in the neck.   Recommendation: If clinically concerned for acute ischemic infarct, further evaluation with MR of the brain without contrast is recommended.   MACRO: Critical Finding:  See findings. Notification was initiated on 6/25/2025 at 1:28 pm by  Shaq Cardona.  (**-OCF-**)   Signed by: Shaq Cardona 6/25/2025 1:28 PM Dictation workstation:   [id number]    CT brain attack head wo IV contrast  Result Date: 6/25/2025  Interpreted By:  David Zamora, STUDY: CT BRAIN ATTACK HEAD WO IV CONTRAST; 6/25/2025 1:09 pm   INDICATION: Signs/Symptoms:Stroke Evaluation   COMPARISON: None   ACCESSION NUMBER(S): [id number]   ORDERING CLINICIAN: BRIAN RAMOS   TECHNIQUE: Axial images were obtained through the brain. No IV contrast was administered.   All CT examinations are performed with one or more of the following dose reduction techniques: Automated Exposure Control, adjustment of mA and/or kV according to patient size, or use of iterative reconstruction techniques.   FINDINGS: The ventricles are enlarged but midline in position, with proportional prominence of the sulci,  due to atrophy. Patchy periventricular hypodensities are present suggestive of small vessel ischemic white matter disease. Remote lacunar infarct is noted in the left thalamus. There is no intracranial hemorrhage. No mass or mass effect is seen. The osseus structures are unremarkable.       Age related changes and remote left thalamic infarct without acute intracranial process.   The emergency department was notified about the findings by secure message 1320 hours.   Signed by: David Zamora 6/25/2025 1:19 PM Dictation workstation:   [id number]    CT cervical spine wo IV contrast  Result Date: 6/20/2025  * * *Final Report* * * DATE OF EXAM: Jun 20 2025  7:45PM   MYC   [id number]  -  CT CERVICAL SPINE WO IVCON  / ACCESSION #  [id number] PROCEDURE REASON: Spine fracture, cervical, traumatic      * * * * Physician Interpretation * * * *  EXAMINATION: CT BRAIN WO IVCON, CT CERVICAL SPINE WO IVCON CLINICAL HISTORY: Head trauma, moderate to severe. 2 unwitnessed falls today. Hypotension. TECHNIQUE:  Serial axial images without IV contrast were obtained from the vertex to the foramen magnum.  MQ:  CTBWO_3 CT Radiation dose: Integrated Dose-Length Product (DLP) for this visit =   1670  mGy*cm CT Dose Reduction Employed: Automated exposure control (AEC) COMPARISON: 6/16/2025 RESULT: Post-operative change: None. Acute change: No evidence of an acute infarct or other acute parenchymal process. Hemorrhage: No evidence of acute intracranial hemorrhage. ECASS hemorrhagic transformation score: Not Applicable Mass Lesion / Mass Effect: There is no evidence of an intracranial mass or extraaxial fluid collection.  No significant mass effect. Chronic change: Scattered patchy foci of low attenuation are present within the supratentorial white matter, a nonspecific finding that most commonly represents mild small vessel disease. Atherosclerotic vascular calcifications in the walls of the carotid siphons and intracranial vertebral arteries.  Parenchyma: There is moderate generalized volume loss.  The brain parenchyma is otherwise within normal limits for age. Ventricles: Ventricular enlargement concordant with the degree of parenchymal volume loss. Paranasal sinuses and skull base: Paranasal sinus disease appears slightly improved.   The skull base and imaged soft tissues are unremarkable. Localizer images: No additional findings. EXAMINATION:  CT BRAIN WO IVCON, CT CERVICAL SPINE WO IVCON CLINICAL HISTORY:  Neck pain. TECHNIQUE:  Spiral, high resolution axial unenhanced images were obtained from the skull base to the cervicothoracic junction with sagittal and coronal planar reconstructions.  MQ: CTCSPWO_5 CT Radiation dose: Integrated CT Dose-Length Product (DLP) for this visit =  1670 mGy*cm CT Dose Reduction Employed: Automated exposure control (AEC) COMPARISON: 12/9/2024 RESULT: Counting reference:  Craniocervical junction.   Anatomic Variants:  None.  (topogram) images:  No additional findings. Alignment:    Alignment is anatomic. Craniocervical junction:    Craniocervical junction is normal. Osseous structures/fracture:    No evidence of a lytic or blastic process in the visualized spine.  No evidence of acute or chronic fracture.   Stable appearance of anterior fusion with plate and screw device extending from C4 through C7. Cervical soft tissues:    The paraspinal soft tissues are within normal limits. Degenerative changes: Multilevel degenerative changes throughout the spine similar to previous study.    IMPRESSION: 1.  No evidence of acute intracranial abnormality. 2.  No evidence of acute cervical spine fracture or traumatic malalignment. Anatomic Variant:  None.  Assume 7 cervical vertebrae with counting from the craniocervical junction.  : FRANSICO   Transcribe Date/Time: Jun 20 2025  8:48P Dictated by : KASHMIR FORBES MD This examination was interpreted and the report reviewed and electronically signed by: KASHMIR FORBES  MD on Jun 20 2025  9:01PM  EST    CT head wo IV contrast  Result Date: 6/20/2025  * * *Final Report* * * DATE OF EXAM: Jun 20 2025  7:45PM   MYC   0504  -  CT BRAIN WO IVCON   / ACCESSION #  [id number] PROCEDURE REASON: Head trauma, moderate-severe      * * * * Physician Interpretation * * * *  EXAMINATION: CT BRAIN WO IVCON, CT CERVICAL SPINE WO IVCON CLINICAL HISTORY: Head trauma, moderate to severe. 2 unwitnessed falls today. Hypotension. TECHNIQUE:  Serial axial images without IV contrast were obtained from the vertex to the foramen magnum. MQ:  CTBWO_3 CT Radiation dose: Integrated Dose-Length Product (DLP) for this visit =   1670  mGy*cm CT Dose Reduction Employed: Automated exposure control (AEC) COMPARISON: 6/16/2025 RESULT: Post-operative change: None. Acute change: No evidence of an acute infarct or other acute parenchymal process. Hemorrhage: No evidence of acute intracranial hemorrhage. ECASS hemorrhagic transformation score: Not Applicable Mass Lesion / Mass Effect: There is no evidence of an intracranial mass or extraaxial fluid collection.  No significant mass effect. Chronic change: Scattered patchy foci of low attenuation are present within the supratentorial white matter, a nonspecific finding that most commonly represents mild small vessel disease. Atherosclerotic vascular calcifications in the walls of the carotid siphons and intracranial vertebral arteries. Parenchyma: There is moderate generalized volume loss.  The brain parenchyma is otherwise within normal limits for age. Ventricles: Ventricular enlargement concordant with the degree of parenchymal volume loss. Paranasal sinuses and skull base: Paranasal sinus disease appears slightly improved.   The skull base and imaged soft tissues are unremarkable. Localizer images: No additional findings. EXAMINATION:  CT BRAIN WO IVCON, CT CERVICAL SPINE WO IVCON CLINICAL HISTORY:  Neck pain. TECHNIQUE:  Spiral, high resolution axial unenhanced images  were obtained from the skull base to the cervicothoracic junction with sagittal and coronal planar reconstructions.  MQ: CTCSPWO_5 CT Radiation dose: Integrated CT Dose-Length Product (DLP) for this visit =  1670 mGy*cm CT Dose Reduction Employed: Automated exposure control (AEC) COMPARISON: 12/9/2024 RESULT: Counting reference:  Craniocervical junction.   Anatomic Variants:  None.  (topogram) images:  No additional findings. Alignment:    Alignment is anatomic. Craniocervical junction:    Craniocervical junction is normal. Osseous structures/fracture:    No evidence of a lytic or blastic process in the visualized spine.  No evidence of acute or chronic fracture.   Stable appearance of anterior fusion with plate and screw device extending from C4 through C7. Cervical soft tissues:    The paraspinal soft tissues are within normal limits. Degenerative changes: Multilevel degenerative changes throughout the spine similar to previous study.    IMPRESSION: 1.  No evidence of acute intracranial abnormality. 2.  No evidence of acute cervical spine fracture or traumatic malalignment. Anatomic Variant:  None.  Assume 7 cervical vertebrae with counting from the craniocervical junction.  : PSCB   Transcribe Date/Time: Jun 20 2025  8:48P Dictated by : KASHMIR FORBES MD This examination was interpreted and the report reviewed and electronically signed by: KASHMIR FORBES MD on Jun 20 2025  9:01PM  EST    XR chest 1 view  Result Date: 6/16/2025  * * *Final Report* * * DATE OF EXAM: Jun 16 2025  2:32PM   MYX   5376  -  XR CHEST 1V FRONTAL PORT  / ACCESSION #  [id number] PROCEDURE REASON: Mental status change      * * * * Physician Interpretation * * * *  EXAMINATION:  CHEST RADIOGRAPH (PORTABLE SINGLE VIEW AP) Exam Date/Time:  6/16/2025 2:32 PM CLINICAL HISTORY: Mental status change MQ:  XCPR_5 Comparison:  12/09/2024. RESULT: Lines, tubes, and devices:  ACDF is partially imaged and not well-characterized. Lungs  and pleura:  No focal pulmonary opacity.  No pleural effusion or pneumothorax. Cardiomediastinal silhouette:  Stable cardiomediastinal silhouette. Other:  No definite acute osseous abnormality.    IMPRESSION: No acute cardiopulmonary disease. : FRANSICO   Transcribe Date/Time: Jun 16 2025  3:40P Dictated by : BLAYNE SILVA MD This examination was interpreted and the report reviewed and electronically signed by: BLAYNE SILVA MD on Jun 16 2025  3:43PM  EST    CT head wo IV contrast  Result Date: 6/16/2025  * * *Final Report* * * DATE OF EXAM: Jun 16 2025  2:41PM   MYC   0504  -  CT BRAIN WO IVCON   / ACCESSION #  [id number] PROCEDURE REASON: Mental status change, unknown cause      * * * * Physician Interpretation * * * *  EXAMINATION: CT BRAIN WO IVCON CLINICAL HISTORY: Confusion TECHNIQUE:  Serial axial images without IV contrast were obtained from the vertex to the foramen magnum. MQ:  CTBWO_3 CT Radiation dose: Integrated Dose-Length Product (DLP) for this visit =   866  mGy*cm CT Dose Reduction Employed: Automated exposure control (AEC) COMPARISON: None. RESULT: Post-operative change: None. Acute change: No evidence of an acute infarct or other acute parenchymal process. Hemorrhage: No evidence of acute intracranial hemorrhage. ECASS hemorrhagic transformation score: Not Applicable Mass Lesion / Mass Effect: There is no evidence of an intracranial mass or extraaxial fluid collection.  No significant mass effect. Chronic change: Scattered patchy foci of low attenuation are present within the supratentorial white matter, a nonspecific finding that most commonly represents mild small vessel disease. Parenchyma: There is mild generalized volume loss.  The brain parenchyma is otherwise within normal limits for age. Ventricles: Ventricular enlargement concordant with the degree of parenchymal volume loss. Paranasal sinuses and skull base: Opacification of the right maxillary sinus, sphenoid sinus and  ethmoid air cells.   The skull base and imaged soft tissues are unremarkable. Localizer images: No additional findings.    IMPRESSION: 1.  No evidence of acute intracranial pathology. 2.  Opacification of the ethmoid air cells, sphenoid sinus, and right maxillary sinus. : FRANSICO   Transcribe Date/Time: Jun 16 2025  3:25P Dictated by : LISA HANLEY MD This examination was interpreted and the report reviewed and electronically signed by: LISA HANLEY MD on Jun 16 2025  3:28PM  EST      Assessment/Plan   Leukocytosis, most likely due to stress, interval marginal increase  Suspected CVA     No antibiotics  Neurology follow-up  Monitor WBC trend  Further workup with leukocytosis persists  Chest x-ray in a.m.      Christiano Valdez MD

## 2025-06-27 NOTE — CARE PLAN
Problem: General Stroke  Goal: Establish a mutual long term goal with patient by discharge  Outcome: Progressing  Goal: Demonstrate improvement in neurological exam throughout the shift  Outcome: Progressing  Goal: Maintain BP within ordered limits throughout shift  Outcome: Progressing  Goal: Participate in treatment (ie., meds, therapy) throughout shift  Outcome: Progressing  Goal: No symptoms of aspiration throughout shift  Outcome: Progressing  Goal: No symptoms of hemorrhage throughout shift  Outcome: Progressing  Goal: Tolerate enteral feeding throughout shift  Outcome: Progressing  Goal: Decreased nausea/vomiting throughout shift  Outcome: Progressing  Goal: Controlled blood glucose throughout shift  Outcome: Progressing  Goal: Out of bed three times today  Outcome: Progressing     Problem: General Stroke  Goal: Demonstrate improvement in neurological exam throughout the shift  Outcome: Progressing     Problem: ICU Stroke  Goal: Maintain ICP within ordered limits throughout shift  Outcome: Progressing  Goal: Tolerate EVD clamping trial throughout shift  Outcome: Progressing  Goal: Tolerate ventilator weaning trial during shift  Outcome: Progressing  Goal: Maintain patent airway throughout shift  Outcome: Progressing  Goal: Achieve/maintain targeted sodium level throughout shift  Outcome: Progressing     Problem: Pain - Adult  Goal: Verbalizes/displays adequate comfort level or baseline comfort level  Outcome: Progressing     Problem: Safety - Adult  Goal: Free from fall injury  Outcome: Progressing     Problem: Discharge Planning  Goal: Discharge to home or other facility with appropriate resources  Outcome: Progressing     Problem: Chronic Conditions and Co-morbidities  Goal: Patient's chronic conditions and co-morbidity symptoms are monitored and maintained or improved  Outcome: Progressing     Problem: Nutrition  Goal: Nutrient intake appropriate for maintaining nutritional needs  Outcome: Progressing      Problem: Skin  Goal: Decreased wound size/increased tissue granulation at next dressing change  Outcome: Progressing  Goal: Participates in plan/prevention/treatment measures  Outcome: Progressing  Goal: Prevent/manage excess moisture  Outcome: Progressing  Goal: Prevent/minimize sheer/friction injuries  Outcome: Progressing  Goal: Promote/optimize nutrition  Outcome: Progressing  Goal: Promote skin healing  Outcome: Progressing   The patient's goals for the shift include Comfort    The clinical goals for the shift include Safety

## 2025-06-27 NOTE — PROGRESS NOTES
Occupational Therapy    OT Treatment    Patient Name: Nasima Nails  MRN: [id number]  Department: [address]  Room: 13/13-A  Today's Date: 6/27/2025  Time Calculation  Start Time: 1043  Stop Time: 1058  Time Calculation (min): 15 min      Assessment:  OT Assessment: pt limited by agitation, fear of falling, and cognitive deficits during treatment this date in which should would benefit from continued OT services to increase safety, strentgh, and activity tolerance for ADLs.  Prognosis: Poor  Barriers to Discharge Home: Cognition needs, Physical needs  Cognition Needs: 24hr supervision for safety awareness needed  Physical Needs: 24hr mobility assistance needed, 24hr ADL assistance needed  Evaluation/Treatment Tolerance: Treatment limited secondary to agitation  Medical Staff Made Aware: Yes  End of Session Communication: Bedside nurse  End of Session Patient Position: Bed, 3 rail up, Alarm on (call light in reach and all needs met: RN made aware of OT session)  OT Assessment Results: Decreased ADL status, Decreased upper extremity range of motion, Decreased upper extremity strength, Decreased safe judgment during ADL, Decreased cognition, Decreased endurance, Decreased sensation, Decreased fine motor control, Decreased gross motor control, Decreased functional mobility, Decreased trunk control for functional activities  Prognosis: Poor  Barriers to Discharge: Other (Comment) (cognition needs)  Evaluation/Treatment Tolerance: Treatment limited secondary to agitation  Medical Staff Made Aware: Yes  Strengths: Living arrangement secure  Barriers to Participation: Comorbidities, Attitude of self, Ability to acquire knowledge  Plan:  Treatment Interventions: ADL retraining, Functional transfer training, UE strengthening/ROM, Endurance training, Cognitive reorientation, Equipment evaluation/education, Neuromuscular reeducation, Fine motor coordination activities, Compensatory technique education  OT Frequency: 3 times  per week  OT Discharge Recommendations: Moderate intensity level of continued care (Based on current functional status and rehab potential, patient is anticipated to tolerate and benefit from 5 or more days per week of skilled rehabilitative therapy after discharge from this acute inpatient hospitalization.)  Equipment Recommended upon Discharge: Wheelchair  OT Recommended Transfer Status: Assist of 2, Maximum assist  OT - OK to Discharge: Yes  Treatment Interventions: ADL retraining, Functional transfer training, UE strengthening/ROM, Endurance training, Cognitive reorientation, Equipment evaluation/education, Neuromuscular reeducation, Fine motor coordination activities, Compensatory technique education    Subjective   OT Visit Info:  OT Received On: 06/27/25  General Visit Info:  General  Reason for Referral: pt is a 84 y/o female admitted with CVA and frequent falls. pt c/o Right sided weakness and current UTI; impaired mobility  Referred By: Dr. Velazquez  Past Medical History Relevant to Rehab: CVA; CAD; HTN; MI; dementia; agitation; UTI; AAA repair; encephalopathy; hyperlipidemia;  Family/Caregiver Present: No  Co-Treatment: PT  Co-Treatment Reason: physically and medically complex patient requiring 2 person assist for safety  Prior to Session Communication: Bedside nurse  Patient Position Received: Bed, 3 rail up, Alarm on  Preferred Learning Style: verbal, visual  General Comment: pt cleared for OT treatment by nursing; pt intitially pleasant and agreeable to therapy but once starting mobility pt very resistive; fear of falling  Precautions:  Hearing/Visual Limitations: [place]  Medical Precautions: Fall precautions    Pain:  Pain Assessment  Pain Assessment: 0-10  0-10 (Numeric) Pain Score: 0 - No pain    Objective    Cognition:  Cognition  Overall Cognitive Status: Impaired  Orientation Level: Disoriented to situation, Disoriented to time  Cognition Comments: initially very pleasant and cooperative while at bed  level; during mobility towards EOB pt very resistive, has a fear of falling, and does not follow commands; self limiting  Insight: Moderate  Processing Speed: Delayed    Coordination:  Movements are Fluid and Coordinated: No  Upper Body Coordination: Decreased coordination/ataxia RUE, delayed motor planning    Activities of Daily Living:    Toileting  Toileting Comments: +purewick    Bed Mobility/Transfers:   Bed Mobility  Bed Mobility: Yes  Bed Mobility 1  Bed Mobility 1: Supine to sitting  Level of Assistance 1: Dependent  Bed Mobility Comments 1: dependent to bring BLEs and trunk into sitting position on EOB; pt very resistive towards mobility after begining transition onto EOB and required cues throughout for safety with poor carryover; dependent for all aspects of bed mobility  Bed Mobility 2  Bed Mobility  2: Sitting to supine  Level of Assistance 2: Dependent  Bed Mobility Comments 2: dependent to bring BLEs into supine and guide trunk into supine position; cues for hand placement for safety with poor carryover  Bed Mobility 3  Bed Mobility 3: Scooting  Level of Assistance 3: Dependent  Bed Mobility Comments 3: dependent to scoot up towards HOB with use of draw sheet    Transfers  Transfer: No (deferred this date d/t safety; pt very resistive towards EOB)    Functional Mobility:  Functional Mobility  Functional Mobility Performed: No    Sitting Balance:  Static Sitting Balance  Static Sitting-Balance Support: Feet supported, Bilateral upper extremity supported  Static Sitting-Level of Assistance: Dependent  Static Sitting-Comment/Number of Minutes: dependent to sit on EOB due to poor sitting balance, fear of falling, and resistive; pt displaying heavy R lateral lean and posterior lean; pt able to sit on EOB for 30 seconds    Therapy/Activity:    Therapeutic Activity  Therapeutic Activity Performed: Yes  Therapeutic Activity 1: education and cueing for safety and sequencing throughout mobility with poor  carryover    Outcome Measures:  [facility] Daily Activity  Putting on and taking off regular lower body clothing: Total  Bathing (including washing, rinsing, drying): A lot  Putting on and taking off regular upper body clothing: A lot  Toileting, which includes using toilet, bedpan or urinal: Total  Taking care of personal grooming such as brushing teeth: A lot  Eating Meals: A little  Daily Activity - Total Score: 11        Education Documentation  Body Mechanics, taught by ALEJANDRO Dodson at 6/27/2025 11:15 AM.  Learner: Patient  Readiness: Acceptance  Method: Explanation  Response: No Evidence of Learning  Comment: pt educated on OT POC, safety technqiuies, balance technqiues, body mechanics, and precautions during Ot session    ADL Training, taught by ALEJANDRO Dodson at 6/27/2025 11:15 AM.  Learner: Patient  Readiness: Acceptance  Method: Explanation  Response: No Evidence of Learning  Comment: pt educated on OT POC, safety technqiuies, balance technqiues, body mechanics, and precautions during Ot session    Goals:  Encounter Problems       Encounter Problems (Active)       ADLs       Patient will feed self with supervision level of assistance and verbal cues using PRN adaptive equipment. (Progressing)       Start:  06/26/25    Expected End:  07/26/25            Patient will complete daily grooming tasks with minimal assist  level of assistance and PRN adaptive equipment while supported sitting. (Progressing)       Start:  06/26/25    Expected End:  07/26/25            Patient will complete toileting including hygiene clothing management/hygiene with moderate assist level of assistance and bedside commode. (Progressing)       Start:  06/26/25    Expected End:  07/26/25               TRANSFERS       Patient will perform bed mobility minimal assist  level of assistance and bed rails in order to improve safety and independence with mobility (Progressing)       Start:  06/26/25    Expected End:  07/26/25             Patient will complete functional transfer to BSC/toilet with least restrictive device with moderate assist level of assistance. (Not Progressing)       Start:  06/26/25    Expected End:  07/26/25

## 2025-06-27 NOTE — CARE PLAN
The patient's goals for the shift include Comfort    The clinical goals for the shift include NO FALLS    Problem: General Stroke  Goal: Establish a mutual long term goal with patient by discharge  Outcome: Progressing  Goal: Demonstrate improvement in neurological exam throughout the shift  Outcome: Progressing  Goal: Maintain BP within ordered limits throughout shift  Outcome: Progressing  Goal: Participate in treatment (ie., meds, therapy) throughout shift  Outcome: Progressing  Goal: No symptoms of aspiration throughout shift  Outcome: Progressing  Goal: No symptoms of hemorrhage throughout shift  Outcome: Progressing  Goal: Tolerate enteral feeding throughout shift  Outcome: Progressing  Goal: Decreased nausea/vomiting throughout shift  Outcome: Progressing  Goal: Controlled blood glucose throughout shift  Outcome: Progressing  Goal: Out of bed three times today  Outcome: Progressing     Problem: ICU Stroke  Goal: Maintain ICP within ordered limits throughout shift  Outcome: Progressing  Goal: Tolerate EVD clamping trial throughout shift  Outcome: Progressing  Goal: Tolerate ventilator weaning trial during shift  Outcome: Progressing  Goal: Maintain patent airway throughout shift  Outcome: Progressing  Goal: Achieve/maintain targeted sodium level throughout shift  Outcome: Progressing     Problem: Pain - Adult  Goal: Verbalizes/displays adequate comfort level or baseline comfort level  Outcome: Progressing     Problem: Safety - Adult  Goal: Free from fall injury  Outcome: Progressing     Problem: Discharge Planning  Goal: Discharge to home or other facility with appropriate resources  Outcome: Progressing     Problem: Chronic Conditions and Co-morbidities  Goal: Patient's chronic conditions and co-morbidity symptoms are monitored and maintained or improved  Outcome: Progressing     Problem: Nutrition  Goal: Nutrient intake appropriate for maintaining nutritional needs  Outcome: Progressing     Problem:  Skin  Goal: Decreased wound size/increased tissue granulation at next dressing change  Outcome: Progressing  Goal: Participates in plan/prevention/treatment measures  Outcome: Progressing  Goal: Prevent/manage excess moisture  Outcome: Progressing  Goal: Prevent/minimize sheer/friction injuries  Outcome: Progressing  Goal: Promote/optimize nutrition  Outcome: Progressing  Goal: Promote skin healing  Outcome: Progressing

## 2025-06-27 NOTE — PROGRESS NOTES
Physical Therapy    Physical Therapy Treatment    Patient Name: Nasima Nails  MRN: [id number]  Department: [address]  Room: 13/13-  Today's Date: 6/27/2025  Time Calculation  Start Time: 1042  Stop Time: 1058  Time Calculation (min): 16 min         Assessment/Plan   PT Assessment  Rehab Prognosis: Poor  Barriers to Discharge Home: Cognition needs, Physical needs  Cognition Needs: 24hr supervision for safety awareness needed  Physical Needs: 24hr mobility assistance needed, 24hr ADL assistance needed, High falls risk due to function or environment  End of Session Communication: Bedside nurse  Assessment Comment: Pt made poor progress towards stated goals this session. Pt very resistive and demos poor activity tolerance.  End of Session Patient Position: Bed, 3 rail up, Alarm on  PT Plan  Inpatient/Swing Bed or Outpatient: Inpatient  PT Plan  Treatment/Interventions: Bed mobility, Transfer training, Balance training, Strengthening, Therapeutic exercise  PT Plan: Ongoing PT  PT Frequency: 3 times per week (Based on current functional status and rehab potential, patient is anticipated to tolerate and benefit from 5 or more days per week of skilled rehabilitative therapy after discharge from this acute inpatient hospitalization.)  PT Discharge Recommendations: Moderate intensity level of continued care  Equipment Recommended upon Discharge: Wheelchair  PT Recommended Transfer Status: Assist x2  PT - OK to Discharge: Yes    PT Visit Info:  PT Received On: 06/27/25     General Visit Information:   General  Reason for Referral: pt is a 84 y/o female admitted with CVA and frequent falls. pt c/o Right sided weakness and current UTI; impaired mobility  Referred By: Dr. Velazquez  Past Medical History Relevant to Rehab: CVA; CAD; HTN; MI; dementia; agitation; UTI; AAA repair; encephalopathy; hyperlipidemia;  Family/Caregiver Present: No  Co-Treatment: OT  Co-Treatment Reason: to maximize safety and participation  Prior to  Session Communication: Bedside nurse  Patient Position Received: Bed, 3 rail up, Alarm on  General Comment: Pt cleared for PT by nursing. Pt initially agreeable to PT services. Becoming resistive with mobility.    Subjective   Precautions:  Precautions  Hearing/Visual Limitations: [place]  Medical Precautions: Fall precautions      Objective   Pain:  Pain Assessment  Pain Assessment: 0-10  0-10 (Numeric) Pain Score: 0 - No pain  Cognition:  Cognition  Overall Cognitive Status: Impaired at baseline  Orientation Level: Disoriented to place, Disoriented to time, Disoriented to situation  Following Commands:  (poor command following)  Cognition Comments: pt initially positive and agreeable to PT/transferring to bedside chair. Upon Sup>Sit pt becoming resistive and trying to return to supine despite encouragement.    Postural Control:  Static Sitting Balance  Static Sitting-Balance Support: Bilateral upper extremity supported, Feet supported  Static Sitting-Level of Assistance: Dependent  Static Sitting-Comment/Number of Minutes: poor seated static balance    Treatments:  Therapeutic Activity  Therapeutic Activity Performed: Yes  Therapeutic Activity 1: Seated at EOB for ~30 seconds with poor tolerance and balance. Pt becoming resistive and attempting to return to supine.    Bed Mobility  Bed Mobility: Yes  Bed Mobility 1  Bed Mobility 1: Supine to sitting, Sitting to supine  Level of Assistance 1: Dependent, +2  Bed Mobility Comments 1: Total assist x2 for alll aspects of bed mobility. Pt resistive and grasping bed rails firmly with poor command following.    Transfers  Transfer: No (Unsafe to attempt this date)    Outcome Measures:  [facility] Basic Mobility  Turning from your back to your side while in a flat bed without using bedrails: Total  Moving from lying on your back to sitting on the side of a flat bed without using bedrails: Total  Moving to and from bed to chair (including a wheelchair): Total  Standing up from a  chair using your arms (e.g. wheelchair or bedside chair): Total  To walk in hospital room: Total  Climbing 3-5 steps with railing: Total  Basic Mobility - Total Score: 6    Education Documentation  Precautions, taught by Zonia Hernandez PTA at 6/27/2025 11:11 AM.  Learner: Patient  Readiness: Nonacceptance  Method: Explanation  Response: Needs Reinforcement, No Evidence of Learning  Comment: POC, fall prevention, OOB mobility    Body Mechanics, taught by Zonia Hernandez PTA at 6/27/2025 11:11 AM.  Learner: Patient  Readiness: Nonacceptance  Method: Explanation  Response: Needs Reinforcement, No Evidence of Learning  Comment: POC, fall prevention, OOB mobility    Mobility Training, taught by Zonia Hernandez PTA at 6/27/2025 11:11 AM.  Learner: Patient  Readiness: Nonacceptance  Method: Explanation  Response: Needs Reinforcement, No Evidence of Learning  Comment: POC, fall prevention, OOB mobility    Education Comments  No comments found.      Encounter Problems       Encounter Problems (Active)       Mobility       STG - Patient will propel the wheelchair 40' x 1 using B UE strength and CGA (Not Progressing)       Start:  06/26/25    Expected End:  07/10/25               PT Transfers       STG - Transfer from bed to chair via SPT with MIN A (Not Progressing)       Start:  06/26/25    Expected End:  07/10/25            STG - Patient to transfer to and from sit to supine via log rolling with MIN A (Progressing)       Start:  06/26/25    Expected End:  07/10/25            STG - Patient will roll side to side with SBA (Progressing)       Start:  06/26/25    Expected End:  07/10/25

## 2025-06-27 NOTE — PROGRESS NOTES
06/27/25 1353   Discharge Planning   Expected Discharge Disposition SNF   Does the patient need discharge transport arranged? Yes   RoundTrip coordination needed? Yes     Patient accepted at [facility], updated notes attached to referral and sent to facility for review.     Awaiting update from MD regarding medical readiness, will need precert started.     UPDATE 1400:  Neuro signed off.   Requested direct submit team initiate precert at this time.

## 2025-06-27 NOTE — PROGRESS NOTES
Nasima Nails is a 85 y.o. female on day 2 of admission presenting with Cerebrovascular accident (CVA), unspecified mechanism (Multi).      Subjective   Alert pleasant       Objective     Last Recorded Vitals  BP (!) 116/46 (BP Location: Right arm, Patient Position: Lying)   Pulse 87   Temp 36.5 °C (97.7 °F) (Temporal)   Resp 17   Wt 61.4 kg (135 lb 5.8 oz)   SpO2 93%   Intake/Output last 3 Shifts:    Intake/Output Summary (Last 24 hours) at 6/27/2025 1426  Last data filed at 6/27/2025 0606  Gross per 24 hour   Intake 480 ml   Output 200 ml   Net 280 ml       Admission Weight  Weight: 61.8 kg (136 lb 3.9 oz) (06/25/25 1323)    Daily Weight  06/27/25 : 61.4 kg (135 lb 5.8 oz)    Image Results  ECG 12 lead  Sinus rhythm with occasional Premature ventricular complexes  Nonspecific ST-T changes  No previous ECGs available  Confirmed by Micheal Mendez ([id number]) on 6/26/2025 9:27:40 PM  CT head wo IV contrast  Narrative: Interpreted By:  Girish Evans,   STUDY:  CT HEAD WO IV CONTRAST      INDICATION:  Signs/Symptoms:Follow-up to assess for stroke, unable to do MRI      COMPARISON:  Head CT 06/25/2025.      ACCESSION NUMBER(S):  [id number]      ORDERING CLINICIAN:  AGATA LIU      TECHNIQUE:  CT of the head was performed without the administration of  intravenous contrast.      FINDINGS:  BRAIN PARENCHYMA: No acute intraparenchymal hemorrhage, acute  territorial infarct or masses. Moderate microangiopathic disease and  diffuse parenchymal volume loss. Chronic infarcts in the left  thalamus. Disproportion enlargement of the sylvian fissures compared  to the sulci at the vertices.      MIDLINE SHIFT OR HERNIATION: No midline shift or herniation.      VENTRICLES: Moderate ventriculomegaly.      DURAL VENOUS SINUSES: No hyperdensity to suggest acute thrombus.      EXTRA-AXIAL SPACES (epidural, subdural, subarachnoid spaces and basal  cisterns): No collections.      VISUALIZED ORBITS: Normal.      VISUALIZED  PARANASAL SINUSES AND MASTOID AIR CELLS: Paranasal sinuses  are clear. Tympanomastoid cavities are aerated.      SOFT TISSUES: Normal.      OSSEOUS STRUCTURES: Normal.      Impression: No acute intracranial abnormality. No evidence of acute territorial  infarct.      Findings which may be seen in the setting of adult type  hydrocephalus. Correlation with clinical exam recommended.      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      Signed by: Girish Evans 6/26/2025 4:18 PM  Dictation workstation:   [id number]      Physical Exam/unchanged right dided weak    Relevant Results                              Assessment & Plan  Frequent falls    Cognitive impairment    Right sided weakness    Recurrent UTI    Mri pending for rehab           Fiona Velazquez MD

## 2025-06-28 ENCOUNTER — APPOINTMENT (OUTPATIENT)
Dept: RADIOLOGY | Facility: HOSPITAL | Age: 85
End: 2025-06-28
Payer: MEDICARE

## 2025-06-28 VITALS
HEART RATE: 93 BPM | TEMPERATURE: 97.3 F | BODY MASS INDEX: 24.54 KG/M2 | DIASTOLIC BLOOD PRESSURE: 74 MMHG | SYSTOLIC BLOOD PRESSURE: 129 MMHG | HEIGHT: 62 IN | WEIGHT: 133.38 LBS | OXYGEN SATURATION: 92 % | RESPIRATION RATE: 17 BRPM

## 2025-06-28 LAB
ALBUMIN SERPL BCP-MCNC: 3.5 G/DL (ref 3.4–5)
ALP SERPL-CCNC: 83 U/L (ref 33–136)
ALT SERPL W P-5'-P-CCNC: 8 U/L (ref 7–45)
ANION GAP SERPL CALCULATED.3IONS-SCNC: 12 MMOL/L (ref 10–20)
AST SERPL W P-5'-P-CCNC: 18 U/L (ref 9–39)
BASOPHILS # BLD AUTO: 0.09 X10*3/UL (ref 0–0.1)
BASOPHILS NFR BLD AUTO: 0.8 %
BILIRUB SERPL-MCNC: 0.4 MG/DL (ref 0–1.2)
BUN SERPL-MCNC: 25 MG/DL (ref 6–23)
CALCIUM SERPL-MCNC: 10.2 MG/DL (ref 8.6–10.3)
CHLORIDE SERPL-SCNC: 105 MMOL/L (ref 98–107)
CO2 SERPL-SCNC: 26 MMOL/L (ref 21–32)
CREAT SERPL-MCNC: 0.62 MG/DL (ref 0.5–1.05)
EGFRCR SERPLBLD CKD-EPI 2021: 87 ML/MIN/1.73M*2
EOSINOPHIL # BLD AUTO: 0.2 X10*3/UL (ref 0–0.4)
EOSINOPHIL NFR BLD AUTO: 1.8 %
ERYTHROCYTE [DISTWIDTH] IN BLOOD BY AUTOMATED COUNT: 15.9 % (ref 11.5–14.5)
GLUCOSE BLD MANUAL STRIP-MCNC: 105 MG/DL (ref 74–99)
GLUCOSE BLD MANUAL STRIP-MCNC: 144 MG/DL (ref 74–99)
GLUCOSE BLD MANUAL STRIP-MCNC: 96 MG/DL (ref 74–99)
GLUCOSE SERPL-MCNC: 93 MG/DL (ref 74–99)
HCT VFR BLD AUTO: 41.1 % (ref 36–46)
HGB BLD-MCNC: 13.7 G/DL (ref 12–16)
IMM GRANULOCYTES # BLD AUTO: 0.08 X10*3/UL (ref 0–0.5)
IMM GRANULOCYTES NFR BLD AUTO: 0.7 % (ref 0–0.9)
LYMPHOCYTES # BLD AUTO: 2.44 X10*3/UL (ref 0.8–3)
LYMPHOCYTES NFR BLD AUTO: 22.2 %
MCH RBC QN AUTO: 29 PG (ref 26–34)
MCHC RBC AUTO-ENTMCNC: 33.3 G/DL (ref 32–36)
MCV RBC AUTO: 87 FL (ref 80–100)
MONOCYTES # BLD AUTO: 0.94 X10*3/UL (ref 0.05–0.8)
MONOCYTES NFR BLD AUTO: 8.6 %
NEUTROPHILS # BLD AUTO: 7.23 X10*3/UL (ref 1.6–5.5)
NEUTROPHILS NFR BLD AUTO: 65.9 %
NRBC BLD-RTO: 0 /100 WBCS (ref 0–0)
PLATELET # BLD AUTO: 416 X10*3/UL (ref 150–450)
POTASSIUM SERPL-SCNC: 4.2 MMOL/L (ref 3.5–5.3)
PROT SERPL-MCNC: 7.3 G/DL (ref 6.4–8.2)
RBC # BLD AUTO: 4.72 X10*6/UL (ref 4–5.2)
SODIUM SERPL-SCNC: 139 MMOL/L (ref 136–145)
WBC # BLD AUTO: 11 X10*3/UL (ref 4.4–11.3)

## 2025-06-28 PROCEDURE — 80053 COMPREHEN METABOLIC PANEL: CPT | Performed by: INTERNAL MEDICINE

## 2025-06-28 PROCEDURE — 82947 ASSAY GLUCOSE BLOOD QUANT: CPT

## 2025-06-28 PROCEDURE — 71045 X-RAY EXAM CHEST 1 VIEW: CPT | Performed by: RADIOLOGY

## 2025-06-28 PROCEDURE — 2500000001 HC RX 250 WO HCPCS SELF ADMINISTERED DRUGS (ALT 637 FOR MEDICARE OP): Performed by: NURSE PRACTITIONER

## 2025-06-28 PROCEDURE — 85025 COMPLETE CBC W/AUTO DIFF WBC: CPT | Performed by: INTERNAL MEDICINE

## 2025-06-28 PROCEDURE — 36415 COLL VENOUS BLD VENIPUNCTURE: CPT | Performed by: INTERNAL MEDICINE

## 2025-06-28 PROCEDURE — 71045 X-RAY EXAM CHEST 1 VIEW: CPT

## 2025-06-28 PROCEDURE — 2500000002 HC RX 250 W HCPCS SELF ADMINISTERED DRUGS (ALT 637 FOR MEDICARE OP, ALT 636 FOR OP/ED): Performed by: NURSE PRACTITIONER

## 2025-06-28 RX ADMIN — CLOPIDOGREL 75 MG: 75 TABLET ORAL at 07:56

## 2025-06-28 RX ADMIN — LEVOTHYROXINE SODIUM 100 MCG: 0.1 TABLET ORAL at 05:32

## 2025-06-28 RX ADMIN — ASPIRIN 81 MG: 81 TABLET, COATED ORAL at 07:56

## 2025-06-28 RX ADMIN — DIVALPROEX SODIUM 250 MG: 125 CAPSULE, COATED PELLETS ORAL at 07:56

## 2025-06-28 RX ADMIN — AMLODIPINE BESYLATE 5 MG: 5 TABLET ORAL at 07:56

## 2025-06-28 ASSESSMENT — PAIN - FUNCTIONAL ASSESSMENT: PAIN_FUNCTIONAL_ASSESSMENT: 0-10

## 2025-06-28 ASSESSMENT — PAIN SCALES - GENERAL: PAINLEVEL_OUTOF10: 0 - NO PAIN

## 2025-06-28 NOTE — CARE PLAN
The patient's goals for the shift include Comfort    The clinical goals for the shift include Maintain appropriate behaviors    Over the shift, the patient did not make progress toward the following goals. Barriers to progression include confusion and disorientation. Recommendations to address these barriers include frequent reorienting, calm interactions.    Problem: General Stroke  Goal: Establish a mutual long term goal with patient by discharge  Outcome: Progressing  Goal: Demonstrate improvement in neurological exam throughout the shift  Outcome: Progressing  Goal: Maintain BP within ordered limits throughout shift  Outcome: Progressing  Goal: Participate in treatment (ie., meds, therapy) throughout shift  Outcome: Progressing  Goal: No symptoms of aspiration throughout shift  Outcome: Progressing  Goal: No symptoms of hemorrhage throughout shift  Outcome: Progressing  Goal: Tolerate enteral feeding throughout shift  Outcome: Progressing  Goal: Decreased nausea/vomiting throughout shift  Outcome: Progressing  Goal: Controlled blood glucose throughout shift  Outcome: Progressing  Goal: Out of bed three times today  Outcome: Progressing     Problem: Pain - Adult  Goal: Verbalizes/displays adequate comfort level or baseline comfort level  Outcome: Progressing     Problem: Safety - Adult  Goal: Free from fall injury  Outcome: Progressing     Problem: Discharge Planning  Goal: Discharge to home or other facility with appropriate resources  Outcome: Progressing     Problem: Chronic Conditions and Co-morbidities  Goal: Patient's chronic conditions and co-morbidity symptoms are monitored and maintained or improved  Outcome: Progressing     Problem: Nutrition  Goal: Nutrient intake appropriate for maintaining nutritional needs  Outcome: Progressing     Problem: Skin  Goal: Decreased wound size/increased tissue granulation at next dressing change  Outcome: Progressing  Flowsheets (Taken 6/28/2025 0107 by Amalia  PATRICK Awad)  Decreased wound size/increased tissue granulation at next dressing change:   Promote sleep for wound healing   Protective dressings over bony prominences  Goal: Participates in plan/prevention/treatment measures  Outcome: Progressing  Flowsheets (Taken 6/28/2025 0107 by Amalia Awad RN)  Participates in plan/prevention/treatment measures:   Elevate heels   Increase activity/out of bed for meals  Goal: Prevent/manage excess moisture  Outcome: Progressing  Flowsheets (Taken 6/28/2025 0107 by Amalia Awad RN)  Prevent/manage excess moisture:   Cleanse incontinence/protect with barrier cream   Moisturize dry skin  Goal: Prevent/minimize sheer/friction injuries  Outcome: Progressing  Flowsheets (Taken 6/28/2025 0107 by Amalia Awad RN)  Prevent/minimize sheer/friction injuries:   Increase activity/out of bed for meals   Use pull sheet   HOB 30 degrees or less  Goal: Promote/optimize nutrition  Outcome: Progressing  Flowsheets (Taken 6/28/2025 0107 by Amalia Awad RN)  Promote/optimize nutrition:   Monitor/record intake including meals   Offer water/supplements/favorite foods  Goal: Promote skin healing  Outcome: Progressing  Flowsheets (Taken 6/28/2025 0107 by Amalia Awad RN)  Promote skin healing:   Protective dressings over bony prominences   Assess skin/pad under line(s)/device(s)   Ensure correct size (line/device) and apply per  instructions   Rotate device position/do not position patient on device

## 2025-06-28 NOTE — PROGRESS NOTES
Nasima Nails is a 85 y.o. female on day 3 of admission presenting with Cerebrovascular accident (CVA), unspecified mechanism (Multi).      Subjective   Feeling good, alert pleasant sitting up in bed eating       Objective     Last Recorded Vitals  /72 (BP Location: Left arm, Patient Position: Lying)   Pulse 85   Temp 36 °C (96.8 °F) (Temporal)   Resp 17   Wt 60.5 kg (133 lb 6.1 oz)   SpO2 95%   Intake/Output last 3 Shifts:    Intake/Output Summary (Last 24 hours) at 6/28/2025 0850  Last data filed at 6/28/2025 0600  Gross per 24 hour   Intake --   Output 250 ml   Net -250 ml       Admission Weight  Weight: 61.8 kg (136 lb 3.9 oz) (06/25/25 1323)    Daily Weight  06/28/25 : 60.5 kg (133 lb 6.1 oz)    Image Results  ECG 12 lead  Sinus rhythm with occasional Premature ventricular complexes  Nonspecific ST-T changes  No previous ECGs available  Confirmed by Micheal Mendez ([id number]) on 6/26/2025 9:27:40 PM  CT head wo IV contrast  Narrative: Interpreted By:  Girish Evans,   STUDY:  CT HEAD WO IV CONTRAST      INDICATION:  Signs/Symptoms:Follow-up to assess for stroke, unable to do MRI      COMPARISON:  Head CT 06/25/2025.      ACCESSION NUMBER(S):  [id number]      ORDERING CLINICIAN:  AGATA LIU      TECHNIQUE:  CT of the head was performed without the administration of  intravenous contrast.      FINDINGS:  BRAIN PARENCHYMA: No acute intraparenchymal hemorrhage, acute  territorial infarct or masses. Moderate microangiopathic disease and  diffuse parenchymal volume loss. Chronic infarcts in the left  thalamus. Disproportion enlargement of the sylvian fissures compared  to the sulci at the vertices.      MIDLINE SHIFT OR HERNIATION: No midline shift or herniation.      VENTRICLES: Moderate ventriculomegaly.      DURAL VENOUS SINUSES: No hyperdensity to suggest acute thrombus.      EXTRA-AXIAL SPACES (epidural, subdural, subarachnoid spaces and basal  cisterns): No collections.      VISUALIZED ORBITS:  Normal.      VISUALIZED PARANASAL SINUSES AND MASTOID AIR CELLS: Paranasal sinuses  are clear. Tympanomastoid cavities are aerated.      SOFT TISSUES: Normal.      OSSEOUS STRUCTURES: Normal.      Impression: No acute intracranial abnormality. No evidence of acute territorial  infarct.      Findings which may be seen in the setting of adult type  hydrocephalus. Correlation with clinical exam recommended.      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      Signed by: Girish Evans 6/26/2025 4:18 PM  Dictation workstation:   [id number]      Physical Exam/unchanged    Relevant Results                              Assessment & Plan  Frequent falls    Cognitive impairment    Right sided weakness    Recurrent UTI    For snf           Fiona Velazquez MD

## 2025-06-28 NOTE — PROGRESS NOTES
Nasima Nails is a 85 y.o. female on day 3 of admission presenting with Cerebrovascular accident (CVA), unspecified mechanism (Multi).    Subjective   Interval History:   Patient seen and examined  Resting comfortably        Review of Systems   Unable to perform ROS: Mental status change       Objective   Range of Vitals (last 24 hours)  Heart Rate:  []   Temp:  [36 °C (96.8 °F)-36.5 °C (97.7 °F)]   Resp:  [17-18]   BP: (110-131)/(46-82)   Weight:  [60.5 kg (133 lb 6.1 oz)]   SpO2:  [85 %-97 %]   Daily Weight  06/28/25 : 60.5 kg (133 lb 6.1 oz)    Body mass index is 24.79 kg/m².    Physical Exam  Constitutional:       General: She is awake.   HENT:      Head: Normocephalic and atraumatic.      Right Ear: External ear normal.      Left Ear: External ear normal.   Eyes:      General: No scleral icterus.     Extraocular Movements: Extraocular movements intact.      Conjunctiva/sclera: Conjunctivae normal.   Cardiovascular:      Rate and Rhythm: Normal rate and regular rhythm.      Heart sounds: Normal heart sounds.   Pulmonary:      Effort: Pulmonary effort is normal.      Breath sounds: Normal breath sounds.   Musculoskeletal:      Cervical back: Normal range of motion and neck supple.      Right lower leg: No edema.      Left lower leg: No edema.   Skin:     General: Skin is warm and dry.   Neurological:      Mental Status: She is confused.   Psychiatric:         Behavior: Behavior is cooperative.     Antibiotics  This patient does not have an active medication from one of the medication groupers.    Relevant Results  Labs  Results from last 72 hours   Lab Units 06/28/25  0616 06/27/25  1646 06/26/25  0554   WBC AUTO x10*3/uL 11.0 12.8* 11.5*   HEMOGLOBIN g/dL 13.7 12.5 13.3   HEMATOCRIT % 41.1 39.1 39.1   PLATELETS AUTO x10*3/uL 416 425 404   NEUTROS PCT AUTO % 65.9 61.8 67.1   LYMPHS PCT AUTO % 22.2 25.1 21.3   MONOS PCT AUTO % 8.6 10.0 7.7   EOS PCT AUTO % 1.8 1.9 2.4     Results from last 72 hours  "  Lab Units 06/28/25  0616 06/27/25  1646 06/26/25  0554   SODIUM mmol/L 139 138 138   POTASSIUM mmol/L 4.2 4.3 4.0   CHLORIDE mmol/L 105 104 103   CO2 mmol/L 26 28 27   BUN mg/dL 25* 24* 20   CREATININE mg/dL 0.62 0.73 0.75   GLUCOSE mg/dL 93 86 91   CALCIUM mg/dL 10.2 10.1 10.1   ANION GAP mmol/L 12 10 12   EGFR mL/min/1.73m*2 87 81 78     Results from last 72 hours   Lab Units 06/28/25  0616 06/27/25  1646 06/25/25  1348   ALK PHOS U/L 83 81 87   BILIRUBIN TOTAL mg/dL 0.4 0.4 0.6   PROTEIN TOTAL g/dL 7.3 7.3 7.5   ALT U/L 8 10 16   AST U/L 18 18 23   ALBUMIN g/dL 3.5 3.4 3.5     Estimated Creatinine Clearance: 56.1 mL/min (by C-G formula based on SCr of 0.62 mg/dL).  No results found for: \"CRP\"  Microbiology    Imaging  XR chest 1 view  Result Date: 6/28/2025  Interpreted By:  Mendoza Sesay, STUDY: XR CHEST 1 VIEW; 6/28/2025 11:03 am   INDICATION: CLINICAL INFORMATION: Signs/Symptoms:Pneumonia.   COMPARISON: 05/13/2013   ACCESSION NUMBER(S): [id number]   ORDERING CLINICIAN: ADAM HANSON   TECHNIQUE: Portable chest one view.   FINDINGS: Cardiac silhouette does not appear to be enlarged. The pulmonary vasculature is slightly indistinct suggesting mild pulmonary vascular congestion.  No alveolar consolidation is identified. Small effusions may be obscured on this portable study. There is a large cervical screw and plate fusion.       The pulmonary vasculature is slightly indistinct suggesting mild pulmonary vascular congestion.   MACRO: none   Signed by: Mendoza Sesay 6/28/2025 1:53 PM Dictation workstation:   [id number]    ECG 12 lead  Result Date: 6/26/2025  Sinus rhythm with occasional Premature ventricular complexes Nonspecific ST-T changes No previous ECGs available Confirmed by Micheal Mendez ([id number]) on 6/26/2025 9:27:40 PM    CT head wo IV contrast  Result Date: 6/26/2025  Interpreted By:  Girish Evans, STUDY: CT HEAD WO IV CONTRAST   INDICATION: Signs/Symptoms:Follow-up to assess for stroke, unable " to do MRI   COMPARISON: Head CT 06/25/2025.   ACCESSION NUMBER(S): [id number]   ORDERING CLINICIAN: AGATA LIU   TECHNIQUE: CT of the head was performed without the administration of intravenous contrast.   FINDINGS: BRAIN PARENCHYMA: No acute intraparenchymal hemorrhage, acute territorial infarct or masses. Moderate microangiopathic disease and diffuse parenchymal volume loss. Chronic infarcts in the left thalamus. Disproportion enlargement of the sylvian fissures compared to the sulci at the vertices.   MIDLINE SHIFT OR HERNIATION: No midline shift or herniation.   VENTRICLES: Moderate ventriculomegaly.   DURAL VENOUS SINUSES: No hyperdensity to suggest acute thrombus.   EXTRA-AXIAL SPACES (epidural, subdural, subarachnoid spaces and basal cisterns): No collections.   VISUALIZED ORBITS: Normal.   VISUALIZED PARANASAL SINUSES AND MASTOID AIR CELLS: Paranasal sinuses are clear. Tympanomastoid cavities are aerated.   SOFT TISSUES: Normal.   OSSEOUS STRUCTURES: Normal.       No acute intracranial abnormality. No evidence of acute territorial infarct.   Findings which may be seen in the setting of adult type hydrocephalus. Correlation with clinical exam recommended.   Moderate microangiopathic disease noted and chronic left thalamic infarct.   Signed by: Girish Evans 6/26/2025 4:18 PM Dictation workstation:   [id number]    CT brain attack angio head and neck W and WO IV contrast  Result Date: 6/25/2025  Interpreted By:  Shaq Cardona, STUDY: CT BRAIN ATTACK ANGIO HEAD AND NECK W AND WO IV CONTRAST;  6/25/2025 1:11 pm   INDICATION: Signs/Symptoms:NIH score 6.     COMPARISON: None.   ACCESSION NUMBER(S): [id number]   ORDERING CLINICIAN: MALINDA MORENO   TECHNIQUE: Unenhanced CT images of the head were obtained. Subsequently, 50 ML of Omnipaque 350 was administered intravenously and axial images of the head and neck were acquired.  Coronal, sagittal, and 3-D reconstructions were provided for review.   FINDINGS:  CTA HEAD FINDINGS:   Anterior circulation: There is atherosclerotic calcification of the cavernous and supraclinoid segment of the bilateral internal carotid artery with mild focal narrowing. There is moderate to severe focal narrowing of the distal A2 segment of the left CHRIS (series 5, image 802). Right CHRIS and bilateral MCAs are patent.   Posterior circulation: There is moderate focal narrowing of the P2 segment of the left PCA (series 5, image 736) and severe focal narrowing of the P2 segment of the right PCA (series 5, image 734). Intradural segment of the right vertebral artery is not visualized, can be hypoplastic or due to chronic occlusion. The basilar artery is patent.   Scratch the CTA NECK FINDINGS:   Right carotid vessels: Atherosclerotic calcification of the right common carotid artery, extending to the right proximal internal carotid artery resulting in narrowing of the lumen measuring 3 mm in compared to distal segment measuring up to 5 mm, compatible with 40% stenosis.   Left carotid vessels: Atherosclerotic calcification of the left common carotid artery, extending to the proximal left internal carotid artery, resulting in narrowing of the lumen measuring 3.8 mm in compared to distal segment measuring up to 5 mm, compatible with 30% stenosis.   Vertebral vessels: Dominant left vertebral artery. The visualized segments of the cervical vertebral arteries are normal in caliber.       1. Moderate to severe focal narrowing of the distal A2 segment of the left CHRIS. 2. Moderate focal narrowing of the P2 segment of the left PCA and severe focal narrowing of the P2 segment of the right PCA. 3. Intradural segment of the right vertebral artery is not visualized, can be hypoplastic or due to chronic occlusion. 4. Approximately 40% right anteriorly% left narrowing of the internal carotid arteries in the neck.   Recommendation: If clinically concerned for acute ischemic infarct, further evaluation with MR of the  brain without contrast is recommended.   MACRO: Critical Finding:  See findings. Notification was initiated on 6/25/2025 at 1:28 pm by  Shaq Cardona.  (**-OCF-**)   Signed by: Shaq Cardona 6/25/2025 1:28 PM Dictation workstation:   [id number]    CT brain attack head wo IV contrast  Result Date: 6/25/2025  Interpreted By:  David Zamora, STUDY: CT BRAIN ATTACK HEAD WO IV CONTRAST; 6/25/2025 1:09 pm   INDICATION: Signs/Symptoms:Stroke Evaluation   COMPARISON: None   ACCESSION NUMBER(S): [id number]   ORDERING CLINICIAN: BRIAN RAMOS   TECHNIQUE: Axial images were obtained through the brain. No IV contrast was administered.   All CT examinations are performed with one or more of the following dose reduction techniques: Automated Exposure Control, adjustment of mA and/or kV according to patient size, or use of iterative reconstruction techniques.   FINDINGS: The ventricles are enlarged but midline in position, with proportional prominence of the sulci, due to atrophy. Patchy periventricular hypodensities are present suggestive of small vessel ischemic white matter disease. Remote lacunar infarct is noted in the left thalamus. There is no intracranial hemorrhage. No mass or mass effect is seen. The osseus structures are unremarkable.       Age related changes and remote left thalamic infarct without acute intracranial process.   The emergency department was notified about the findings by secure message 1320 hours.   Signed by: David Zamora 6/25/2025 1:19 PM Dictation workstation:   [id number]    CT cervical spine wo IV contrast  Result Date: 6/20/2025  * * *Final Report* * * DATE OF EXAM: Jun 20 2025  7:45PM   [facility] [id number]  -  CT CERVICAL SPINE WO IVCON  / ACCESSION #  [id number] PROCEDURE REASON: Spine fracture, cervical, traumatic      * * * * Physician Interpretation * * * *  EXAMINATION: CT BRAIN WO IVCON, CT CERVICAL SPINE WO IVCON CLINICAL HISTORY: Head trauma, moderate to severe. 2 unwitnessed falls  today. Hypotension. TECHNIQUE:  Serial axial images without IV contrast were obtained from the vertex to the foramen magnum.  MQ:  CTBWO_3 CT Radiation dose: Integrated Dose-Length Product (DLP) for this visit =   1670  mGy*cm CT Dose Reduction Employed: Automated exposure control (AEC) COMPARISON: 6/16/2025 RESULT: Post-operative change: None. Acute change: No evidence of an acute infarct or other acute parenchymal process. Hemorrhage: No evidence of acute intracranial hemorrhage. ECASS hemorrhagic transformation score: Not Applicable Mass Lesion / Mass Effect: There is no evidence of an intracranial mass or extraaxial fluid collection.  No significant mass effect. Chronic change: Scattered patchy foci of low attenuation are present within the supratentorial white matter, a nonspecific finding that most commonly represents mild small vessel disease. Atherosclerotic vascular calcifications in the walls of the carotid siphons and intracranial vertebral arteries. Parenchyma: There is moderate generalized volume loss.  The brain parenchyma is otherwise within normal limits for age. Ventricles: Ventricular enlargement concordant with the degree of parenchymal volume loss. Paranasal sinuses and skull base: Paranasal sinus disease appears slightly improved.   The skull base and imaged soft tissues are unremarkable. Localizer images: No additional findings. EXAMINATION:  CT BRAIN WO IVCON, CT CERVICAL SPINE WO IVCON CLINICAL HISTORY:  Neck pain. TECHNIQUE:  Spiral, high resolution axial unenhanced images were obtained from the skull base to the cervicothoracic junction with sagittal and coronal planar reconstructions.  MQ: CTCSPWO_5 CT Radiation dose: Integrated CT Dose-Length Product (DLP) for this visit =  1670 mGy*cm CT Dose Reduction Employed: Automated exposure control (AEC) COMPARISON: 12/9/2024 RESULT: Counting reference:  Craniocervical junction.   Anatomic Variants:  None.  (topogram) images:  No additional  findings. Alignment:    Alignment is anatomic. Craniocervical junction:    Craniocervical junction is normal. Osseous structures/fracture:    No evidence of a lytic or blastic process in the visualized spine.  No evidence of acute or chronic fracture.   Stable appearance of anterior fusion with plate and screw device extending from C4 through C7. Cervical soft tissues:    The paraspinal soft tissues are within normal limits. Degenerative changes: Multilevel degenerative changes throughout the spine similar to previous study.    IMPRESSION: 1.  No evidence of acute intracranial abnormality. 2.  No evidence of acute cervical spine fracture or traumatic malalignment. Anatomic Variant:  None.  Assume 7 cervical vertebrae with counting from the craniocervical junction.  : [employer]   Transcribe Date/Time: Jun 20 2025  8:48P Dictated by : KASHMIR FORBES MD This examination was interpreted and the report reviewed and electronically signed by: KASHMIR FORBES MD on Jun 20 2025  9:01PM  EST    CT head wo IV contrast  Result Date: 6/20/2025  * * *Final Report* * * DATE OF EXAM: Jun 20 2025  7:45PM   [facility]   0504  -  CT BRAIN WO IVCON   / ACCESSION #  [id number] PROCEDURE REASON: Head trauma, moderate-severe      * * * * Physician Interpretation * * * *  EXAMINATION: CT BRAIN WO IVCON, CT CERVICAL SPINE WO IVCON CLINICAL HISTORY: Head trauma, moderate to severe. 2 unwitnessed falls today. Hypotension. TECHNIQUE:  Serial axial images without IV contrast were obtained from the vertex to the foramen magnum. MQ:  CTBWO_3 CT Radiation dose: Integrated Dose-Length Product (DLP) for this visit =   1670  mGy*cm CT Dose Reduction Employed: Automated exposure control (AEC) COMPARISON: 6/16/2025 RESULT: Post-operative change: None. Acute change: No evidence of an acute infarct or other acute parenchymal process. Hemorrhage: No evidence of acute intracranial hemorrhage. ECASS hemorrhagic transformation score: Not Applicable Mass  Lesion / Mass Effect: There is no evidence of an intracranial mass or extraaxial fluid collection.  No significant mass effect. Chronic change: Scattered patchy foci of low attenuation are present within the supratentorial white matter, a nonspecific finding that most commonly represents mild small vessel disease. Atherosclerotic vascular calcifications in the walls of the carotid siphons and intracranial vertebral arteries. Parenchyma: There is moderate generalized volume loss.  The brain parenchyma is otherwise within normal limits for age. Ventricles: Ventricular enlargement concordant with the degree of parenchymal volume loss. Paranasal sinuses and skull base: Paranasal sinus disease appears slightly improved.   The skull base and imaged soft tissues are unremarkable. Localizer images: No additional findings. EXAMINATION:  CT BRAIN WO IVCON, CT CERVICAL SPINE WO IVCON CLINICAL HISTORY:  Neck pain. TECHNIQUE:  Spiral, high resolution axial unenhanced images were obtained from the skull base to the cervicothoracic junction with sagittal and coronal planar reconstructions.  MQ: CTCSPWO_5 CT Radiation dose: Integrated CT Dose-Length Product (DLP) for this visit =  1670 mGy*cm CT Dose Reduction Employed: Automated exposure control (AEC) COMPARISON: 12/9/2024 RESULT: Counting reference:  Craniocervical junction.   Anatomic Variants:  None.  (topogram) images:  No additional findings. Alignment:    Alignment is anatomic. Craniocervical junction:    Craniocervical junction is normal. Osseous structures/fracture:    No evidence of a lytic or blastic process in the visualized spine.  No evidence of acute or chronic fracture.   Stable appearance of anterior fusion with plate and screw device extending from C4 through C7. Cervical soft tissues:    The paraspinal soft tissues are within normal limits. Degenerative changes: Multilevel degenerative changes throughout the spine similar to previous study.    IMPRESSION: 1.   No evidence of acute intracranial abnormality. 2.  No evidence of acute cervical spine fracture or traumatic malalignment. Anatomic Variant:  None.  Assume 7 cervical vertebrae with counting from the craniocervical junction.  : [facility]   Transcribe Date/Time: Jun 20 2025  8:48P Dictated by : KASHMIR FORBES MD This examination was interpreted and the report reviewed and electronically signed by: KASHMIR FORBES MD on Jun 20 2025  9:01PM  EST    XR chest 1 view  Result Date: 6/16/2025  * * *Final Report* * * DATE OF EXAM: Jun 16 2025  2:32PM   [facility]   5376  -  XR CHEST 1V FRONTAL PORT  / ACCESSION #  [id number] PROCEDURE REASON: Mental status change      * * * * Physician Interpretation * * * *  EXAMINATION:  CHEST RADIOGRAPH (PORTABLE SINGLE VIEW AP) Exam Date/Time:  6/16/2025 2:32 PM CLINICAL HISTORY: Mental status change MQ:  XCPR_5 Comparison:  12/09/2024. RESULT: Lines, tubes, and devices:  ACDF is partially imaged and not well-characterized. Lungs and pleura:  No focal pulmonary opacity.  No pleural effusion or pneumothorax. Cardiomediastinal silhouette:  Stable cardiomediastinal silhouette. Other:  No definite acute osseous abnormality.    IMPRESSION: No acute cardiopulmonary disease. : [facility]   Transcribe Date/Time: Jun 16 2025  3:40P Dictated by : BLAYNE SILVA MD This examination was interpreted and the report reviewed and electronically signed by: BLAYNE SILVA MD on Jun 16 2025  3:43PM  EST    CT head wo IV contrast  Result Date: 6/16/2025  * * *Final Report* * * DATE OF EXAM: Jun 16 2025  2:41PM   [facility]   0504  -  CT BRAIN WO IVCON   / ACCESSION #  [id number] PROCEDURE REASON: Mental status change, unknown cause      * * * * Physician Interpretation * * * *  EXAMINATION: CT BRAIN WO IVCON CLINICAL HISTORY: Confusion TECHNIQUE:  Serial axial images without IV contrast were obtained from the vertex to the foramen magnum. MQ:  CTBWO_3 CT Radiation dose: Integrated Dose-Length Product  (DLP) for this visit =   866  mGy*cm CT Dose Reduction Employed: Automated exposure control (AEC) COMPARISON: None. RESULT: Post-operative change: None. Acute change: No evidence of an acute infarct or other acute parenchymal process. Hemorrhage: No evidence of acute intracranial hemorrhage. ECASS hemorrhagic transformation score: Not Applicable Mass Lesion / Mass Effect: There is no evidence of an intracranial mass or extraaxial fluid collection.  No significant mass effect. Chronic change: Scattered patchy foci of low attenuation are present within the supratentorial white matter, a nonspecific finding that most commonly represents mild small vessel disease. Parenchyma: There is mild generalized volume loss.  The brain parenchyma is otherwise within normal limits for age. Ventricles: Ventricular enlargement concordant with the degree of parenchymal volume loss. Paranasal sinuses and skull base: Opacification of the right maxillary sinus, sphenoid sinus and ethmoid air cells.   The skull base and imaged soft tissues are unremarkable. Localizer images: No additional findings.    IMPRESSION: 1.  No evidence of acute intracranial pathology. 2.  Opacification of the ethmoid air cells, sphenoid sinus, and right maxillary sinus. : [facility]   Transcribe Date/Time: Jun 16 2025  3:25P Dictated by : LISA HANLEY MD This examination was interpreted and the report reviewed and electronically signed by: LISA HANLEY MD on Jun 16 2025  3:28PM  EST      Assessment/Plan   Leukocytosis, most likely due to stress, resolved-chest x-ray negative for pneumonia  Suspected CVA     No antibiotics  Neurology follow-up  Monitor WBC trend  Discharge planning      Christiano Valdez MD

## 2025-06-28 NOTE — PROGRESS NOTES
06/28/25 1010   Discharge Planning   Home or Post Acute Services Post acute facilities (Rehab/SNF/etc)   Type of Post Acute Facility Services Rehab;Skilled nursing   Expected Discharge Disposition SNF  ([place] Auth obtained. Await bed availability and confirmation of medical readiness from Dr. Velazquez. CM following)     10 am: Auth received and message sent to SNF re: bed availability. CM will confirm med readiness with Dr. Velazquez. Standby for update    Update 140 pm-cleared by Dr. Velazquez. CM tasked DSC with 7000 completion and setting up ambo (stretcher, bls, no oxygen) for 3 pm. Bedside RN aware via [employer] chat. Danny mendoza notified and they are amenable as well. [facility]     Dispo: [facility]   [place]: Ambo today at 3 pm      Transport confirmation: The [employer] Vehicle you requested for Nasima VILLABethany in unit/room 13-A on 06/28/2025 is scheduled to arrive at 3:00pm EDT! [facility] is handling this ride and you can contact them at [phone]     Phone number provided via epic to bedside RN to give report

## 2025-06-28 NOTE — NURSING NOTE
Assumed care of pt. She is agitated and uncooperative. Refuses to follow commands. Moves about restlessly in bed. Unable to sleep. When staff enters the room to provide care she tells them to 'get out,' or 'goodbye.'

## 2025-06-28 NOTE — CARE PLAN
The patient's goals for the shift include Comfort    The clinical goals for the shift include Free from injury.    Over the shift, the patient did not make progress toward the following goals. Barriers to progression include extreme confusion.. Recommendations to address these barriers include try to explain things in very simple terms.

## 2025-06-30 LAB — BACTERIA BLD CULT: NORMAL

## 2025-06-30 NOTE — DISCHARGE SUMMARY
Discharge Diagnosis  Cerebrovascular accident (CVA), unspecified mechanism (Multi)           Issues Requiring Follow-Up  Previous stroke right sided weak    Discharge Meds     Medication List      START taking these medications     oxygen gas therapy; Commonly known as: O2; Inhale 1 L/min at 60,000   mL/hr once every 24 hours.     CONTINUE taking these medications     acetaminophen 325 mg tablet; Commonly known as: Tylenol   amLODIPine 5 mg tablet; Commonly known as: Norvasc   aspirin 81 mg EC tablet   atorvastatin 80 mg tablet; Commonly known as: Lipitor   cholecalciferol 50 mcg (2,000 units) capsule; Commonly known as: Vitamin   D-3   clopidogrel 75 mg tablet; Commonly known as: Plavix   divalproex sprinkle 125 mg DR capsule; Commonly known as: Depakote   Sprinkle   hydrOXYzine HCL 25 mg tablet; Commonly known as: Atarax   levothyroxine 100 mcg tablet; Commonly known as: Synthroid, Levoxyl   mirtazapine 15 mg tablet; Commonly known as: Remeron   multivitamin tablet   * QUEtiapine 25 mg tablet; Commonly known as: SEROquel   * QUEtiapine 25 mg tablet; Commonly known as: SEROquel   sennosides 8.6 mg tablet; Commonly known as: Senokot  * This list has 2 medication(s) that are the same as other medications   prescribed for you. Read the directions carefully, and ask your doctor or   other care provider to review them with you.     STOP taking these medications     cetirizine 10 mg tablet; Commonly known as: ZyrTEC   dextromethorphan-guaifenesin  mg/5 mL liquid   melatonin 10 mg tablet       Test Results Pending At Discharge  Pending Labs       Order Current Status    Extra Urine Gray Tube In process    Urinalysis with Reflex Culture and Microscopic In process    Blood Culture Preliminary result            Hospital Course   Same had  mri MRI 3 days ago    Pertinent Physical Exam At Time of Discharge  Physical Exam    Outpatient Follow-Up  No future appointments.      Fiona Velazquez MD

## 2025-07-02 NOTE — DOCUMENTATION CLARIFICATION NOTE
"    PATIENT:               SUNDAY REZA  ACCT #:                  [id number]  MRN:                       [id number]  :                       1940  ADMIT DATE:       2025 12:59 PM  DISCH DATE:        2025 3:26 PM  RESPONDING PROVIDER #:        [id number]          PROVIDER RESPONSE TEXT:    UTI  ruled out after workup    CDI QUERY TEXT:    Clarification        Instruction:    Based on your assessment of the patient and the clinical information, please provide the requested documentation by clicking on the appropriate radio button and enter any additional information if prompted.    Question: Please further clarify the diagnosis of UTI as    When answering this query, please exercise your independent professional judgment. The fact that a question is being asked, does not imply that any particular answer is desired or expected.    The patient's clinical indicators include:  Clinical Information:  65 year old female presents with weakness, Acute CVA    Clinical Indicators:  - Urine Culture- \"Bacteria identified No growth at 4 days - FINAL REPORT\".    - - WBC- 16.1,   11.5,   12.8,   11    - H/P- \"Admitted - at [facility] for acute mental status change, recurrent UTI and fall. Also evaluated by infectious disease for persistent leukocytosis (15 range) and recurrent UTI. She was treated with 4 days of bactrim and stopped when repeat urine culture showed normal elder.  Incontinent of urine at baseline. -check UA. Noted hx recurrent UTI and just treated with 4 days of bactrim in the last week during [facility]\".    - ID Consult- \"Resolving leukocytosis, most likely secondary to stress\". \"Suspected CVA  No antibiotics\".    - Dr. Velazquez- \"Recurrent UTI\".    - DC Summary- Dr. Velazquez- \" Acute CVA\".      Treatment:  Urine culture  NO antibiotics ordered  ID Consult    Risk Factors:  Recurrent UTIs  Options provided:  -- UTI  ruled out after workup  -- UTI ruled in for " this admission  -- Other - I will add my own diagnosis  -- Refer to Clinical Documentation Reviewer    Query created by: Leanne Lopez on 7/1/2025 2:35 PM      Electronically signed by:  HARSHAD PALMER MD 7/2/2025 8:58 AM

## 2025-07-03 ENCOUNTER — NURSING HOME VISIT (OUTPATIENT)
Dept: POST ACUTE CARE | Facility: EXTERNAL LOCATION | Age: 85
End: 2025-07-03
Payer: MEDICARE

## 2025-07-03 ENCOUNTER — LAB REQUISITION (OUTPATIENT)
Dept: LAB | Facility: HOSPITAL | Age: 85
End: 2025-07-03
Payer: MEDICARE

## 2025-07-03 DIAGNOSIS — Z00.00 ROUTINE GENERAL MEDICAL EXAMINATION AT A HEALTH CARE FACILITY: ICD-10-CM

## 2025-07-03 DIAGNOSIS — E03.9 HYPOTHYROIDISM, UNSPECIFIED: ICD-10-CM

## 2025-07-03 DIAGNOSIS — K59.04 CHRONIC IDIOPATHIC CONSTIPATION: ICD-10-CM

## 2025-07-03 DIAGNOSIS — F03.94 UNSPECIFIED DEMENTIA, UNSPECIFIED SEVERITY, WITH ANXIETY: ICD-10-CM

## 2025-07-03 DIAGNOSIS — R29.6 FREQUENT FALLS: ICD-10-CM

## 2025-07-03 DIAGNOSIS — E55.0 RICKETS, ACTIVE: ICD-10-CM

## 2025-07-03 DIAGNOSIS — I63.81 LEFT SIDED LACUNAR INFARCTION (MULTI): ICD-10-CM

## 2025-07-03 DIAGNOSIS — R41.89 COGNITIVE IMPAIRMENT: ICD-10-CM

## 2025-07-03 DIAGNOSIS — I10 ESSENTIAL HYPERTENSION, BENIGN: ICD-10-CM

## 2025-07-03 DIAGNOSIS — R53.1 RIGHT SIDED WEAKNESS: ICD-10-CM

## 2025-07-03 DIAGNOSIS — E78.2 MIXED HYPERLIPIDEMIA: ICD-10-CM

## 2025-07-03 DIAGNOSIS — Z78.9 NURSING HOME RESIDENT: ICD-10-CM

## 2025-07-03 LAB
25(OH)D3 SERPL-MCNC: 45 NG/ML (ref 30–100)
ANION GAP SERPL CALC-SCNC: 12 MMOL/L (ref 10–20)
BASOPHILS # BLD AUTO: 0.07 X10*3/UL (ref 0–0.1)
BASOPHILS NFR BLD AUTO: 0.9 %
BUN SERPL-MCNC: 26 MG/DL (ref 6–23)
CALCIUM SERPL-MCNC: 9.9 MG/DL (ref 8.6–10.3)
CHLORIDE SERPL-SCNC: 104 MMOL/L (ref 98–107)
CO2 SERPL-SCNC: 28 MMOL/L (ref 21–32)
CREAT SERPL-MCNC: 0.57 MG/DL (ref 0.5–1.05)
EGFRCR SERPLBLD CKD-EPI 2021: 89 ML/MIN/1.73M*2
EOSINOPHIL # BLD AUTO: 0.32 X10*3/UL (ref 0–0.4)
EOSINOPHIL NFR BLD AUTO: 4 %
ERYTHROCYTE [DISTWIDTH] IN BLOOD BY AUTOMATED COUNT: 16.2 % (ref 11.5–14.5)
GLUCOSE SERPL-MCNC: 78 MG/DL (ref 74–99)
HCT VFR BLD AUTO: 38.4 % (ref 36–46)
HGB BLD-MCNC: 12.3 G/DL (ref 12–16)
IMM GRANULOCYTES # BLD AUTO: 0.05 X10*3/UL (ref 0–0.5)
IMM GRANULOCYTES NFR BLD AUTO: 0.6 % (ref 0–0.9)
LYMPHOCYTES # BLD AUTO: 2.28 X10*3/UL (ref 0.8–3)
LYMPHOCYTES NFR BLD AUTO: 28.7 %
MCH RBC QN AUTO: 29.8 PG (ref 26–34)
MCHC RBC AUTO-ENTMCNC: 32 G/DL (ref 32–36)
MCV RBC AUTO: 93 FL (ref 80–100)
MONOCYTES # BLD AUTO: 0.85 X10*3/UL (ref 0.05–0.8)
MONOCYTES NFR BLD AUTO: 10.7 %
NEUTROPHILS # BLD AUTO: 4.37 X10*3/UL (ref 1.6–5.5)
NEUTROPHILS NFR BLD AUTO: 55.1 %
NRBC BLD-RTO: 0 /100 WBCS (ref 0–0)
PLATELET # BLD AUTO: 377 X10*3/UL (ref 150–450)
POTASSIUM SERPL-SCNC: 4 MMOL/L (ref 3.5–5.3)
RBC # BLD AUTO: 4.13 X10*6/UL (ref 4–5.2)
SODIUM SERPL-SCNC: 140 MMOL/L (ref 136–145)
T4 FREE SERPL-MCNC: 1.18 NG/DL (ref 0.61–1.12)
TSH SERPL-ACNC: 0.51 MIU/L (ref 0.44–3.98)
WBC # BLD AUTO: 7.9 X10*3/UL (ref 4.4–11.3)

## 2025-07-03 PROCEDURE — 84439 ASSAY OF FREE THYROXINE: CPT | Mod: OUT | Performed by: INTERNAL MEDICINE

## 2025-07-03 PROCEDURE — 84443 ASSAY THYROID STIM HORMONE: CPT | Mod: OUT | Performed by: INTERNAL MEDICINE

## 2025-07-03 PROCEDURE — 82306 VITAMIN D 25 HYDROXY: CPT | Mod: OUT,GEALAB | Performed by: INTERNAL MEDICINE

## 2025-07-03 PROCEDURE — 80048 BASIC METABOLIC PNL TOTAL CA: CPT | Mod: OUT | Performed by: INTERNAL MEDICINE

## 2025-07-03 PROCEDURE — 99306 1ST NF CARE HIGH MDM 50: CPT | Performed by: INTERNAL MEDICINE

## 2025-07-03 PROCEDURE — 85025 COMPLETE CBC W/AUTO DIFF WBC: CPT | Mod: OUT | Performed by: INTERNAL MEDICINE

## 2025-07-03 PROCEDURE — 36415 COLL VENOUS BLD VENIPUNCTURE: CPT | Mod: OUT | Performed by: INTERNAL MEDICINE

## 2025-07-07 ENCOUNTER — LAB REQUISITION (OUTPATIENT)
Dept: LAB | Facility: HOSPITAL | Age: 85
End: 2025-07-07
Payer: MEDICARE

## 2025-07-07 DIAGNOSIS — E03.9 HYPOTHYROIDISM, UNSPECIFIED: ICD-10-CM

## 2025-07-07 LAB
T4 FREE SERPL-MCNC: 1.01 NG/DL (ref 0.61–1.12)
TSH SERPL-ACNC: 0.53 MIU/L (ref 0.44–3.98)

## 2025-07-07 PROCEDURE — 84439 ASSAY OF FREE THYROXINE: CPT | Mod: OUT | Performed by: INTERNAL MEDICINE

## 2025-07-07 PROCEDURE — 84443 ASSAY THYROID STIM HORMONE: CPT | Mod: OUT | Performed by: INTERNAL MEDICINE

## 2025-07-07 PROCEDURE — 36415 COLL VENOUS BLD VENIPUNCTURE: CPT | Mod: OUT | Performed by: INTERNAL MEDICINE

## 2025-07-09 ENCOUNTER — LAB REQUISITION (OUTPATIENT)
Dept: LAB | Facility: HOSPITAL | Age: 85
End: 2025-07-09
Payer: MEDICARE

## 2025-07-09 DIAGNOSIS — F03.90 UNSPECIFIED DEMENTIA, UNSPECIFIED SEVERITY, WITHOUT BEHAVIORAL DISTURBANCE, PSYCHOTIC DISTURBANCE, MOOD DISTURBANCE, AND ANXIETY: ICD-10-CM

## 2025-07-09 LAB — VALPROATE SERPL-MCNC: 33 UG/ML (ref 50–100)

## 2025-07-09 PROCEDURE — 36415 COLL VENOUS BLD VENIPUNCTURE: CPT | Mod: OUT | Performed by: INTERNAL MEDICINE

## 2025-07-09 PROCEDURE — 80164 ASSAY DIPROPYLACETIC ACD TOT: CPT | Mod: OUT | Performed by: INTERNAL MEDICINE

## 2025-07-10 ENCOUNTER — NURSING HOME VISIT (OUTPATIENT)
Dept: POST ACUTE CARE | Facility: EXTERNAL LOCATION | Age: 85
End: 2025-07-10
Payer: MEDICARE

## 2025-07-10 ENCOUNTER — LAB REQUISITION (OUTPATIENT)
Dept: LAB | Facility: HOSPITAL | Age: 85
End: 2025-07-10
Payer: MEDICARE

## 2025-07-10 DIAGNOSIS — F03.90 UNSPECIFIED DEMENTIA, UNSPECIFIED SEVERITY, WITHOUT BEHAVIORAL DISTURBANCE, PSYCHOTIC DISTURBANCE, MOOD DISTURBANCE, AND ANXIETY: ICD-10-CM

## 2025-07-10 DIAGNOSIS — F20.9 SCHIZOPHRENIA, UNSPECIFIED TYPE: ICD-10-CM

## 2025-07-10 DIAGNOSIS — R41.89 COGNITIVE IMPAIRMENT: ICD-10-CM

## 2025-07-10 DIAGNOSIS — R29.6 FREQUENT FALLS: ICD-10-CM

## 2025-07-10 DIAGNOSIS — R53.1 RIGHT SIDED WEAKNESS: ICD-10-CM

## 2025-07-10 DIAGNOSIS — K59.04 CHRONIC IDIOPATHIC CONSTIPATION: ICD-10-CM

## 2025-07-10 DIAGNOSIS — I63.81 LEFT SIDED LACUNAR INFARCTION (MULTI): ICD-10-CM

## 2025-07-10 DIAGNOSIS — E03.9 HYPOTHYROIDISM, ACQUIRED: ICD-10-CM

## 2025-07-10 DIAGNOSIS — Z78.9 NURSING HOME RESIDENT: ICD-10-CM

## 2025-07-10 DIAGNOSIS — Z00.00 ROUTINE GENERAL MEDICAL EXAMINATION AT A HEALTH CARE FACILITY: ICD-10-CM

## 2025-07-10 DIAGNOSIS — E78.2 MIXED HYPERLIPIDEMIA: ICD-10-CM

## 2025-07-10 DIAGNOSIS — I10 ESSENTIAL HYPERTENSION, BENIGN: ICD-10-CM

## 2025-07-10 PROCEDURE — 36415 COLL VENOUS BLD VENIPUNCTURE: CPT | Mod: OUT | Performed by: INTERNAL MEDICINE

## 2025-07-10 PROCEDURE — 99308 SBSQ NF CARE LOW MDM 20: CPT | Performed by: INTERNAL MEDICINE

## 2025-07-10 PROCEDURE — 80165 DIPROPYLACETIC ACID FREE: CPT | Mod: OUT | Performed by: INTERNAL MEDICINE

## 2025-07-11 LAB
SCAN RESULT: NORMAL
VALPROATE FREE SERPL-MCNC: 7 UG/ML (ref 4–30)

## 2025-07-17 ENCOUNTER — LAB REQUISITION (OUTPATIENT)
Dept: LAB | Facility: HOSPITAL | Age: 85
End: 2025-07-17
Payer: MEDICARE

## 2025-07-17 DIAGNOSIS — F41.9 ANXIETY DISORDER, UNSPECIFIED: ICD-10-CM

## 2025-07-17 DIAGNOSIS — I10 ESSENTIAL (PRIMARY) HYPERTENSION: ICD-10-CM

## 2025-07-17 DIAGNOSIS — E03.9 HYPOTHYROIDISM, UNSPECIFIED: ICD-10-CM

## 2025-07-17 DIAGNOSIS — F03.90 UNSPECIFIED DEMENTIA, UNSPECIFIED SEVERITY, WITHOUT BEHAVIORAL DISTURBANCE, PSYCHOTIC DISTURBANCE, MOOD DISTURBANCE, AND ANXIETY: ICD-10-CM

## 2025-07-17 PROCEDURE — 80165 DIPROPYLACETIC ACID FREE: CPT | Mod: OUT | Performed by: INTERNAL MEDICINE

## 2025-07-17 PROCEDURE — 36415 COLL VENOUS BLD VENIPUNCTURE: CPT | Mod: OUT | Performed by: INTERNAL MEDICINE

## 2025-07-19 LAB
SCAN RESULT: NORMAL
VALPROATE FREE SERPL-MCNC: 11.5 UG/ML (ref 4–30)

## 2025-08-21 PROBLEM — R41.841 COGNITIVE COMMUNICATION DISORDER: Status: ACTIVE | Noted: 2024-10-15

## 2025-08-21 PROBLEM — Z95.5 PRESENCE OF CORONARY ANGIOPLASTY IMPLANT AND GRAFT: Status: ACTIVE | Noted: 2022-01-01

## 2025-08-21 PROBLEM — F20.9 SCHIZOPHRENIA, UNSPECIFIED: Status: ACTIVE | Noted: 2022-01-01

## 2025-08-21 RX ORDER — MULTIVITAMIN WITH IRON
1 TABLET ORAL DAILY
COMMUNITY
Start: 2024-10-15 | End: 2025-08-21 | Stop reason: DRUGHIGH

## 2025-08-21 RX ORDER — CETIRIZINE HYDROCHLORIDE 10 MG/1
10 TABLET ORAL
COMMUNITY
End: 2025-08-21 | Stop reason: ALTCHOICE

## 2025-08-21 RX ORDER — GUAIFENESIN 100 MG/5ML
200 LIQUID ORAL EVERY 6 HOURS PRN
COMMUNITY

## 2025-08-21 RX ORDER — LEVOTHYROXINE SODIUM 88 UG/1
88 TABLET ORAL DAILY
COMMUNITY
Start: 2024-12-03